# Patient Record
Sex: FEMALE | Employment: UNEMPLOYED | URBAN - METROPOLITAN AREA
[De-identification: names, ages, dates, MRNs, and addresses within clinical notes are randomized per-mention and may not be internally consistent; named-entity substitution may affect disease eponyms.]

---

## 2022-02-02 ENCOUNTER — TELEPHONE (OUTPATIENT)
Dept: FAMILY MEDICINE | Facility: CLINIC | Age: 52
End: 2022-02-02

## 2022-02-02 NOTE — TELEPHONE ENCOUNTER
Reason for Call:  Other appointment    Detailed comments: IMMIGRATION PHYSICAL    Phone Number Patient can be reached at: Cell number on file:    Telephone Information:   Mobile [phone]       Best Time: ANYTIME    Can we leave a detailed message on this number?     Call taken on 2/2/2022 at 4:13 PM by Charity Mckay LPN

## 2022-02-04 NOTE — TELEPHONE ENCOUNTER
Patient calling on status of scheduling of immigration physicals for her and daughter. Appointments have been made for 2/16 with back to back appointments as requested. Informed of cost and that they will need to bring $250. 00 for each physical, a valid ID or passport, insurance card, which patient stated they do not have any insurance. Informed to bring any immunization records, and any recent labs that may have been completed to the appointment. Patient voiced understanding. Charity Mckay LPN

## 2022-02-16 ENCOUNTER — OFFICE VISIT (OUTPATIENT)
Dept: FAMILY MEDICINE | Facility: CLINIC | Age: 52
End: 2022-02-16

## 2022-02-16 VITALS
HEART RATE: 100 BPM | DIASTOLIC BLOOD PRESSURE: 70 MMHG | WEIGHT: 107.13 LBS | TEMPERATURE: 97.9 F | SYSTOLIC BLOOD PRESSURE: 138 MMHG | HEIGHT: 57 IN | OXYGEN SATURATION: 100 % | RESPIRATION RATE: 16 BRPM | BODY MASS INDEX: 23.11 KG/M2

## 2022-02-16 DIAGNOSIS — Z23 NEED FOR PROPHYLACTIC VACCINATION AND INOCULATION AGAINST INFLUENZA: ICD-10-CM

## 2022-02-16 DIAGNOSIS — Z23 HIGH PRIORITY FOR 2019-NCOV VACCINE: ICD-10-CM

## 2022-02-16 DIAGNOSIS — Z23 NEED FOR VACCINATION: ICD-10-CM

## 2022-02-16 DIAGNOSIS — Z02.89 HISTORY AND PHYSICAL EXAMINATION, IMMIGRATION: Primary | ICD-10-CM

## 2022-02-16 PROCEDURE — 86706 HEP B SURFACE ANTIBODY: CPT | Performed by: FAMILY MEDICINE

## 2022-02-16 PROCEDURE — 86787 VARICELLA-ZOSTER ANTIBODY: CPT | Performed by: FAMILY MEDICINE

## 2022-02-16 PROCEDURE — 90682 RIV4 VACC RECOMBINANT DNA IM: CPT | Performed by: FAMILY MEDICINE

## 2022-02-16 PROCEDURE — 86708 HEPATITIS A ANTIBODY: CPT | Performed by: FAMILY MEDICINE

## 2022-02-16 PROCEDURE — 90471 IMMUNIZATION ADMIN: CPT | Performed by: FAMILY MEDICINE

## 2022-02-16 PROCEDURE — 86481 TB AG RESPONSE T-CELL SUSP: CPT | Performed by: FAMILY MEDICINE

## 2022-02-16 PROCEDURE — 87591 N.GONORRHOEAE DNA AMP PROB: CPT | Performed by: FAMILY MEDICINE

## 2022-02-16 PROCEDURE — 36415 COLL VENOUS BLD VENIPUNCTURE: CPT | Performed by: FAMILY MEDICINE

## 2022-02-16 PROCEDURE — 99385 PREV VISIT NEW AGE 18-39: CPT | Mod: 25 | Performed by: FAMILY MEDICINE

## 2022-02-16 PROCEDURE — 90472 IMMUNIZATION ADMIN EACH ADD: CPT | Performed by: FAMILY MEDICINE

## 2022-02-16 PROCEDURE — 90715 TDAP VACCINE 7 YRS/> IM: CPT | Performed by: FAMILY MEDICINE

## 2022-02-16 PROCEDURE — 0052A COVID-19,PF,PFIZER (12+ YRS): CPT | Performed by: FAMILY MEDICINE

## 2022-02-16 PROCEDURE — 86762 RUBELLA ANTIBODY: CPT | Performed by: FAMILY MEDICINE

## 2022-02-16 PROCEDURE — 86780 TREPONEMA PALLIDUM: CPT | Performed by: FAMILY MEDICINE

## 2022-02-16 PROCEDURE — 91305 COVID-19,PF,PFIZER (12+ YRS): CPT | Performed by: FAMILY MEDICINE

## 2022-02-16 ASSESSMENT — PAIN SCALES - GENERAL: PAINLEVEL: NO PAIN (0)

## 2022-02-16 NOTE — PROGRESS NOTES
Monet is here for INS physical exam.  She moved from the [facility] in 2021.  She lives in [place], [place] and is not working. Stated that overall she is doing well and has no concern today.  Denied of headache or dizziness.  No URI symptoms and denied of CP or SOB.  No fever or chill. No N/V/D/C.  No history of any of STD and denied of its risk.  No abnormal vaginal discharge or having rash in the pelvic area.  Denied of depression and has no history of depression, anxiety or mood disorder. No history or current homicidal or suicidal ideation.  No hallucination and has no history of psychiatric hospitalization.  No pain; no problem with sleeping.  Eating and drinking ok.  No problem with urination.  Denied of coughing or nigth sweat. No unintentional weight loss.  No exposure to TB or history of TB.  Has not had a positive PPD. Denied of drug use.      Problem list and histories reviewed & adjusted, as indicated.  Additional history: as documented      PAST MEDICAL HISTORY:   Past Medical History:   Diagnosis Date     No known health problems        PAST SURGICAL HISTORY:   Past Surgical History:   Procedure Laterality Date     NO HISTORY OF SURGERY         FAMILY HISTORY:   Family History   Problem Relation Age of Onset     No Known Problems Mother      Substance Abuse Father         alcoholism     Unknown/Adopted Maternal Grandmother      Unknown/Adopted Maternal Grandfather      Unknown/Adopted Paternal Grandmother      Unknown/Adopted Paternal Grandfather      Heart Disease Brother      No Known Problems Sister      No Known Problems Daughter      No Known Problems Brother      No Known Problems Brother      No Known Problems Brother      No Known Problems Sister      No Known Problems Sister      No Known Problems Sister      No Known Problems Sister        SOCIAL HISTORY:   Social History     Tobacco Use     Smoking status: Never Smoker     Smokeless tobacco: Never Used   Substance Use Topics     Alcohol use:  "Never        No Known Allergies    No current outpatient medications on file.         ROS:  Constitutional, HEENT, cardiovascular, pulmonary, gi and gu systems are negative, except as otherwise noted.      OBJECTIVE:                                                      .Vitals: /70 (BP Location: Right arm, Patient Position: Sitting, Cuff Size: Adult Regular)   Pulse 100   Temp 97.9  F (36.6  C) (Temporal)   Resp 16   Ht 1.45 m (4' 9.1\")   Wt 48.6 kg (107 lb 2 oz)   SpO2 100%   BMI 23.10 kg/m    BMI= Body mass index is 23.1 kg/m .   GENERAL: healthy, alert and no distress  EYES: Eyes grossly normal to inspection, PERRL and conjunctivae and sclerae normal  HENT: ear canals and TM's normal, nose and mouth without ulcers or lesions.  Nares are non-congested. Oropharynx is pink and moist. No tender with palpation to the sinuses.  NECK: no adenopathy, no palpable masses, or scars and thyroid normal to palpation  RESP: lungs clear to auscultation - no rales, rhonchi or wheezes  CV: regular rate and rhythm, no murmur, no peripheral edema and peripheral pulses strong  ABDOMEN: soft, nontender, no hepatosplenomegaly or palpable masses and bowel sounds normal  MS: no gross musculoskeletal defects noted, no edema. Walk with no limping, normal gait. All 4 extremities are equally in strength. Ankle, knees, hips, shoulders, elbows and wrists exams normal. Normal fine motor skills on fingers. Back is straight, no lordosis or scoliosis. No tender with palpation.  SKIN: no suspicious lesions or rashes  NEURO: Normal strength and tone, mentation intact and speech normal  PSYCH: mentation appears normal, affect normal/bright. No hallucination, suicidal or homicidal ideation    Diagnostic Test Results:  Results for orders placed or performed in visit on 02/16/22   Hepatitis Antibody A IgG     Status: Abnormal   Result Value Ref Range    Hepatitis A Antibody IgG Reactive (A) Nonreactive    Narrative    This assay cannot be " used for the diagnosis of acute HAV infection.   Varicella Zoster Virus Antibody IgG     Status: Normal   Result Value Ref Range    VZV Jenny IgG Instrument Value 854.3 >=165.0 Index    Varicella Zoster Antibody IgG Positive Positive   Hepatitis B Surface Antibody     Status: Abnormal   Result Value Ref Range    Hepatitis B Surface Antibody 0.00 (L) >=12.00 m[IU]/mL   Rubella Antibody IgG     Status: Normal   Result Value Ref Range    Rubella Jenny IgG Instrument Value 2.17 >=1.00 Index    Rubella Antibody IgG Positive Positive   Treponema Abs w Reflex to RPR and Titer     Status: Normal   Result Value Ref Range    Treponema Antibody Total Nonreactive Nonreactive   Quantiferon TB Gold Plus Grey Tube     Status: None    Specimen: Arm, Right; Blood   Result Value Ref Range    Quantiferon Nil Tube 0.46 IU/mL   Quantiferon TB Gold Plus Green Tube     Status: None    Specimen: Arm, Right; Blood   Result Value Ref Range    Quantiferon TB1 Tube 0.27 IU/mL   Quantiferon TB Gold Plus Yellow Tube     Status: None    Specimen: Arm, Right; Blood   Result Value Ref Range    Quantiferon TB2 Tube 0.20    Quantiferon TB Gold Plus Purple Tube     Status: None    Specimen: Arm, Right; Blood   Result Value Ref Range    Quantiferon Mitogen 5.93 IU/mL   Neisseria gonorrhoeae PCR     Status: Normal    Specimen: Urine, Voided   Result Value Ref Range    Neisseria gonorrhoeae Negative Negative   Quantiferon TB Gold Plus     Status: None    Specimen: Arm, Right; Blood   Result Value Ref Range    Quantiferon-TB Gold Plus Negative Negative    TB1 Ag minus Nil Value -0.19 IU/mL    TB2 Ag minus Nil Value -0.26 IU/mL    Mitogen minus Nil Result 5.47 IU/mL    Nil Result 0.46 IU/mL   Quantiferon-TB Gold Plus     Status: None    Specimen: Arm, Right; Blood    Narrative    The following orders were created for panel order Quantiferon-TB Gold Plus.  Procedure                               Abnormality         Status                     ---------                                -----------         ------                     Quantiferon TB Gold Plus[[id number]]                         Final result               Quantiferon TB Gold Plus...[[id number]]                      Final result               Quantiferon TB Gold Plus...[[id number]]                      Final result               Quantiferon TB Gold Plus...[[id number]]                      Final result               Quantiferon TB Gold Plus...[[id number]]                      Final result                 Please view results for these tests on the individual orders.           ASSESSMENT/PLAN:                                                      1. Health examination of defined subpopulation  I personal reviewed the report of Medical Examination and Vaccination Record from the Department of [employer]. She has no chronic medical condition. Has not had a positive PPD test and she displayed no symptoms of active TB.  No exposure to TB that she knows of.  Checked the QuantiFERON level today. Denied any risk for STI, but will check for RPR (syphyllis) and gonorrhea. She has no history of depression, anxiety, mood disorder and has never been diagnosed or hospitalized for any psychiatric disorders.  She displayed no physical or mental disorders with associated harmful behavior. She denied of using drug. Reviewed her immunization record, labs as ordered.  Will fill out her paper on her behalf once the results are available. Instructed not to open the sealed envelope.  All of her questions were answered and encouraged to call in if has any concern.    Also informed her that she should follow up with her primary provider for her routine and chronic medical care. I did not address any chronic medical problem today.  She understands.      ICD-10-CM    1. History and physical examination, immigration  Z02.89 Quantiferon-TB Gold Plus     Neisseria gonorrhoeae PCR     Treponema Abs w Reflex to RPR and Titer     Rubella Antibody  IgG     Hepatitis B Surface Antibody     Varicella Zoster Virus Antibody IgG     Hepatitis Antibody A IgG     [place] PHYSICAL     Hepatitis Antibody A IgG     Varicella Zoster Virus Antibody IgG     Hepatitis B Surface Antibody     Rubella Antibody IgG     Treponema Abs w Reflex to RPR and Titer     Quantiferon-TB Gold Plus     Neisseria gonorrhoeae PCR   2. Need for vaccination  Z23 TDAP VACCINE (Adacel, Boostrix)  [[id number]]     INFLUENZA QUAD, RECOMBINANT, P-FREE (RIV4) (FLUBLOK)   3. Need for prophylactic vaccination and inoculation against influenza  Z23    4. High priority for 2019-nCoV vaccine  Z23 COVID-19,PF,PFIZER (12+ Yrs GRAY LABEL)         F/U as needed.    Arya Ramirez Mai, MD  [facility]

## 2022-02-17 LAB
HAV IGG SER QL IA: REACTIVE
HBV SURFACE AB SERPL IA-ACNC: 0 M[IU]/ML
N GONORRHOEA DNA SPEC QL NAA+PROBE: NEGATIVE
RUBV IGG SERPL QL IA: 2.17 INDEX
RUBV IGG SERPL QL IA: POSITIVE
T PALLIDUM AB SER QL: NONREACTIVE
VZV IGG SER QL IA: 854.3 INDEX
VZV IGG SER QL IA: POSITIVE

## 2022-02-18 LAB
GAMMA INTERFERON BACKGROUND BLD IA-ACNC: 0.46 IU/ML
M TB IFN-G BLD-IMP: NEGATIVE
M TB IFN-G CD4+ BCKGRND COR BLD-ACNC: 5.47 IU/ML
MITOGEN IGNF BCKGRD COR BLD-ACNC: -0.19 IU/ML
MITOGEN IGNF BCKGRD COR BLD-ACNC: -0.26 IU/ML
QUANTIFERON MITOGEN: 5.93 IU/ML
QUANTIFERON NIL TUBE: 0.46 IU/ML
QUANTIFERON TB1 TUBE: 0.27 IU/ML
QUANTIFERON TB2 TUBE: 0.2

## 2022-02-22 ENCOUNTER — TELEPHONE (OUTPATIENT)
Dept: FAMILY MEDICINE | Facility: CLINIC | Age: 52
End: 2022-02-22

## 2022-02-22 NOTE — TELEPHONE ENCOUNTER
----- Message from Arya Ramirez Mai, MD sent at 2/21/2022  8:03 AM CST -----  Please let patient know that her labs showed she is immunized to hepatitis A, rubella and chickenpox but not to hepatitis B.  What it means is that she is up-to-date for her MMR, chickenpox and hepatitis A but she is not up-to-date for hepatitis B.  She will need to get the first dose of hepatitis B vaccine as soon as possible for her immigration physical.  She can get it through our clinic here or at any pharmacy.  I need the documentation of her getting the first dose of the hepatitis B at least for her immigration paper.  Thank you

## 2022-02-22 NOTE — TELEPHONE ENCOUNTER
Attempted to reach patient by phone. Unable to leave voicemail due to it not being set up. Will try again later.   Arti Shelley MA 2/22/2022

## 2022-02-23 NOTE — TELEPHONE ENCOUNTER
Patient called back and was given message. She is aware that she needs to get the Hepatitis B shot started, she stated she will call back again when she is ready to schedule it.

## 2022-02-23 NOTE — TELEPHONE ENCOUNTER
Attempted to reach patient by phone. Unable to leave voicemail due to it not being set up. Will try again later.   Arti Shelley MA 2/23/2022

## 2022-02-28 NOTE — TELEPHONE ENCOUNTER
Pharmacy staff was called, pt did get 1st hepatitis B vaccine Engerix-b at [facility] pharmacy on 2/25. Per pharmacy staff, immunization won't show up in [facility] because pharmacy gave it, it will eventually flow into [facility] but it is not there currently.   Amita Patel RN

## 2022-02-28 NOTE — TELEPHONE ENCOUNTER
Patient calling back stating she got this done in the pharmacy in [place].    Please advise    Rebeca Harrison

## 2022-02-28 NOTE — TELEPHONE ENCOUNTER
Immigration physical form completed, may pick it up or mail out at any time.  Please address appropriately.  Please noted double sided copy.

## 2022-08-29 ENCOUNTER — OFFICE VISIT (OUTPATIENT)
Dept: FAMILY MEDICINE | Facility: CLINIC | Age: 52
End: 2022-08-29
Payer: COMMERCIAL

## 2022-08-29 VITALS
HEIGHT: 57 IN | HEART RATE: 68 BPM | DIASTOLIC BLOOD PRESSURE: 74 MMHG | TEMPERATURE: 97.9 F | WEIGHT: 106 LBS | SYSTOLIC BLOOD PRESSURE: 130 MMHG | BODY MASS INDEX: 22.87 KG/M2

## 2022-08-29 DIAGNOSIS — Z00.00 ROUTINE GENERAL MEDICAL EXAMINATION AT A HEALTH CARE FACILITY: Primary | ICD-10-CM

## 2022-08-29 DIAGNOSIS — Z12.31 VISIT FOR SCREENING MAMMOGRAM: ICD-10-CM

## 2022-08-29 DIAGNOSIS — Z12.4 CERVICAL CANCER SCREENING: ICD-10-CM

## 2022-08-29 DIAGNOSIS — Z11.4 SCREENING FOR HIV (HUMAN IMMUNODEFICIENCY VIRUS): ICD-10-CM

## 2022-08-29 DIAGNOSIS — Z11.59 NEED FOR HEPATITIS C SCREENING TEST: ICD-10-CM

## 2022-08-29 DIAGNOSIS — Z13.220 SCREENING FOR HYPERLIPIDEMIA: ICD-10-CM

## 2022-08-29 DIAGNOSIS — Z12.11 SCREEN FOR COLON CANCER: ICD-10-CM

## 2022-08-29 DIAGNOSIS — R10.2 PELVIC PAIN IN FEMALE: ICD-10-CM

## 2022-08-29 LAB
ALBUMIN SERPL-MCNC: 4 G/DL (ref 3.4–5)
ALP SERPL-CCNC: 49 U/L (ref 40–150)
ALT SERPL W P-5'-P-CCNC: 36 U/L (ref 0–50)
ANION GAP SERPL CALCULATED.3IONS-SCNC: 5 MMOL/L (ref 3–14)
AST SERPL W P-5'-P-CCNC: 26 U/L (ref 0–45)
BILIRUB SERPL-MCNC: 0.6 MG/DL (ref 0.2–1.3)
BUN SERPL-MCNC: 16 MG/DL (ref 7–30)
CALCIUM SERPL-MCNC: 8.7 MG/DL (ref 8.5–10.1)
CHLORIDE BLD-SCNC: 104 MMOL/L (ref 94–109)
CHOLEST SERPL-MCNC: 207 MG/DL
CO2 SERPL-SCNC: 29 MMOL/L (ref 20–32)
CREAT SERPL-MCNC: 0.87 MG/DL (ref 0.52–1.04)
ERYTHROCYTE [DISTWIDTH] IN BLOOD BY AUTOMATED COUNT: 12.4 % (ref 10–15)
FASTING STATUS PATIENT QL REPORTED: YES
GFR SERPL CREATININE-BSD FRML MDRD: 80 ML/MIN/1.73M2
GLUCOSE BLD-MCNC: 96 MG/DL (ref 70–99)
HBA1C MFR BLD: 5.5 % (ref 0–5.6)
HCT VFR BLD AUTO: 40 % (ref 35–47)
HCV AB SERPL QL IA: NONREACTIVE
HDLC SERPL-MCNC: 70 MG/DL
HGB BLD-MCNC: 13.5 G/DL (ref 11.7–15.7)
HIV 1+2 AB+HIV1 P24 AG SERPL QL IA: NONREACTIVE
LDLC SERPL CALC-MCNC: 124 MG/DL
MCH RBC QN AUTO: 29.1 PG (ref 26.5–33)
MCHC RBC AUTO-ENTMCNC: 33.8 G/DL (ref 31.5–36.5)
MCV RBC AUTO: 86 FL (ref 78–100)
NONHDLC SERPL-MCNC: 137 MG/DL
PLATELET # BLD AUTO: 266 10E3/UL (ref 150–450)
POTASSIUM BLD-SCNC: 3.8 MMOL/L (ref 3.4–5.3)
PROT SERPL-MCNC: 8.2 G/DL (ref 6.8–8.8)
RBC # BLD AUTO: 4.64 10E6/UL (ref 3.8–5.2)
SODIUM SERPL-SCNC: 138 MMOL/L (ref 133–144)
TRIGL SERPL-MCNC: 64 MG/DL
TSH SERPL DL<=0.005 MIU/L-ACNC: 3.06 MU/L (ref 0.4–4)
WBC # BLD AUTO: 5.6 10E3/UL (ref 4–11)

## 2022-08-29 PROCEDURE — 85027 COMPLETE CBC AUTOMATED: CPT | Performed by: NURSE PRACTITIONER

## 2022-08-29 PROCEDURE — 80061 LIPID PANEL: CPT | Performed by: NURSE PRACTITIONER

## 2022-08-29 PROCEDURE — 80053 COMPREHEN METABOLIC PANEL: CPT | Performed by: NURSE PRACTITIONER

## 2022-08-29 PROCEDURE — G0145 SCR C/V CYTO,THINLAYER,RESCR: HCPCS | Performed by: NURSE PRACTITIONER

## 2022-08-29 PROCEDURE — 83036 HEMOGLOBIN GLYCOSYLATED A1C: CPT | Performed by: NURSE PRACTITIONER

## 2022-08-29 PROCEDURE — 86803 HEPATITIS C AB TEST: CPT | Performed by: NURSE PRACTITIONER

## 2022-08-29 PROCEDURE — 99396 PREV VISIT EST AGE 40-64: CPT | Performed by: NURSE PRACTITIONER

## 2022-08-29 PROCEDURE — 84443 ASSAY THYROID STIM HORMONE: CPT | Performed by: NURSE PRACTITIONER

## 2022-08-29 PROCEDURE — 87624 HPV HI-RISK TYP POOLED RSLT: CPT | Performed by: NURSE PRACTITIONER

## 2022-08-29 PROCEDURE — 87389 HIV-1 AG W/HIV-1&-2 AB AG IA: CPT | Performed by: NURSE PRACTITIONER

## 2022-08-29 PROCEDURE — 36415 COLL VENOUS BLD VENIPUNCTURE: CPT | Performed by: NURSE PRACTITIONER

## 2022-08-29 ASSESSMENT — ENCOUNTER SYMPTOMS
DIZZINESS: 0
DIARRHEA: 0
DYSURIA: 0
NERVOUS/ANXIOUS: 0
ARTHRALGIAS: 0
PARESTHESIAS: 0
ABDOMINAL PAIN: 0
BREAST MASS: 0
HEARTBURN: 0
HEMATOCHEZIA: 0
EYE PAIN: 0
WEAKNESS: 0
FREQUENCY: 0
MYALGIAS: 0
SORE THROAT: 0
COUGH: 0
CONSTIPATION: 0
SHORTNESS OF BREATH: 0
CHILLS: 0
FEVER: 0
HEADACHES: 0
NAUSEA: 0
JOINT SWELLING: 0
PALPITATIONS: 0
HEMATURIA: 0

## 2022-08-29 ASSESSMENT — PAIN SCALES - GENERAL: PAINLEVEL: NO PAIN (0)

## 2022-08-29 NOTE — PATIENT INSTRUCTIONS
Please contact [phone] to schedule your diagnostic procedure     Preventive Health Recommendations  Female Ages 50 - 64    Yearly exam: See your health care provider every year in order to  Review health changes.   Discuss preventive care.    Review your medicines if your doctor has prescribed any.    Get a Pap test every three years (unless you have an abnormal result and your provider advises testing more often).  If you get Pap tests with HPV test, you only need to test every 5 years, unless you have an abnormal result.   You do not need a Pap test if your uterus was removed (hysterectomy) and you have not had cancer.  You should be tested each year for STDs (sexually transmitted diseases) if you're at risk.   Have a mammogram every 1 to 2 years.  Have a colonoscopy at age 50, or have a yearly FIT test (stool test). These exams screen for colon cancer.    Have a cholesterol test every 5 years, or more often if advised.  Have a diabetes test (fasting glucose) every three years. If you are at risk for diabetes, you should have this test more often.   If you are at risk for osteoporosis (brittle bone disease), think about having a bone density scan (DEXA).    Shots: Get a flu shot each year. Get a tetanus shot every 10 years.    Nutrition:   Eat at least 5 servings of fruits and vegetables each day.  Eat whole-grain bread, whole-wheat pasta and brown rice instead of white grains and rice.  Get adequate Calcium and Vitamin D.     Lifestyle  Exercise at least 150 minutes a week (30 minutes a day, 5 days a week). This will help you control your weight and prevent disease.  Limit alcohol to one drink per day.  No smoking.   Wear sunscreen to prevent skin cancer.   See your dentist every six months for an exam and cleaning.  See your eye doctor every 1 to 2 years.

## 2022-08-29 NOTE — PROGRESS NOTES
SUBJECTIVE:   CC: Monet Finley is an 52 year old woman who presents for preventive health visit.       Patient has been advised of split billing requirements and indicates understanding: Yes  Healthy Habits:     Getting at least 3 servings of Calcium per day:  Yes    Bi-annual eye exam:  Yes    Dental care twice a year:  NO    Sleep apnea or symptoms of sleep apnea:  None    Diet:  Regular (no restrictions)    Frequency of exercise:  2-3 days/week    Duration of exercise:  15-30 minutes    Taking medications regularly:  Yes    Medication side effects:  None    PHQ-2 Total Score: 0    Additional concerns today:  No          Today's PHQ-2 Score:   PHQ-2 ( 1999 Pfizer) 8/29/2022   Q1: Little interest or pleasure in doing things 0   Q2: Feeling down, depressed or hopeless 0   PHQ-2 Score 0   Q1: Little interest or pleasure in doing things Several days   Q2: Feeling down, depressed or hopeless Not at all   PHQ-2 Score 1       Abuse: Current or Past (Physical, Sexual or Emotional) - No  Do you feel safe in your environment? Yes    Have you ever done Advance Care Planning? (For example, a Health Directive, POLST, or a discussion with a medical provider or your loved ones about your wishes): No, advance care planning information given to patient to review.  Patient declined advance care planning discussion at this time.    Social History     Tobacco Use     Smoking status: Never Smoker     Smokeless tobacco: Never Used   Substance Use Topics     Alcohol use: Never     If you drink alcohol do you typically have >3 drinks per day or >7 drinks per week? No    Alcohol Use 8/29/2022   Prescreen: >3 drinks/day or >7 drinks/week? Not Applicable       Reviewed orders with patient.  Reviewed health maintenance and updated orders accordingly - Yes  BP Readings from Last 3 Encounters:   08/29/22 130/74   02/16/22 138/70    Wt Readings from Last 3 Encounters:   08/29/22 48.1 kg (106 lb)   02/16/22 48.6 kg (107 lb 2 oz)                   There is no problem list on file for this patient.    Past Surgical History:   Procedure Laterality Date     NO HISTORY OF SURGERY         Social History     Tobacco Use     Smoking status: Never Smoker     Smokeless tobacco: Never Used   Substance Use Topics     Alcohol use: Never     Family History   Problem Relation Age of Onset     No Known Problems Mother      Substance Abuse Father         alcoholism     Unknown/Adopted Maternal Grandmother      Unknown/Adopted Maternal Grandfather      Unknown/Adopted Paternal Grandmother      Unknown/Adopted Paternal Grandfather      Heart Disease Brother      No Known Problems Sister      No Known Problems Daughter      No Known Problems Brother      No Known Problems Brother      No Known Problems Brother      No Known Problems Sister      No Known Problems Sister      No Known Problems Sister      No Known Problems Sister          No current outpatient medications on file.     No Known Allergies  Recent Labs   Lab Test 08/29/22  0745   A1C 5.5        Breast Cancer Screening:    FHS-7: No flowsheet data found.    Mammogram Screening: Recommended annual mammography  Pertinent mammograms are reviewed under the imaging tab.    History of abnormal Pap smear: NO - age 30- 65 PAP every 3 years recommended     Reviewed and updated as needed this visit by clinical staff                    Reviewed and updated as needed this visit by Provider                   Past Medical History:   Diagnosis Date     No known health problems       Past Surgical History:   Procedure Laterality Date     NO HISTORY OF SURGERY       OB History   No obstetric history on file.       Review of Systems   Constitutional: Negative for chills and fever.   HENT: Negative for congestion, ear pain, hearing loss and sore throat.    Eyes: Negative for pain and visual disturbance.   Respiratory: Negative for cough and shortness of breath.    Cardiovascular: Negative for chest pain, palpitations and  "peripheral edema.   Gastrointestinal: Negative for abdominal pain, constipation, diarrhea, heartburn, hematochezia and nausea.   Breasts:  Negative for tenderness, breast mass and discharge.   Genitourinary: Negative for dysuria, frequency, genital sores, hematuria, pelvic pain, urgency, vaginal bleeding and vaginal discharge.   Musculoskeletal: Negative for arthralgias, joint swelling and myalgias.   Skin: Negative for rash.   Neurological: Negative for dizziness, weakness, headaches and paresthesias.   Psychiatric/Behavioral: Negative for mood changes. The patient is not nervous/anxious.           OBJECTIVE:   /74 (BP Location: Right arm, Cuff Size: Adult Regular)   Pulse 68   Temp 97.9  F (36.6  C) (Tympanic)   Ht 1.441 m (4' 8.75\")   Wt 48.1 kg (106 lb)   BMI 23.14 kg/m    Physical Exam  GENERAL: healthy, alert and no distress  EYES: Eyes grossly normal to inspection, PERRL and conjunctivae and sclerae normal  HENT: ear canals and TM's normal, nose and mouth without ulcers or lesions  NECK: no adenopathy, no asymmetry, masses, or scars and thyroid normal to palpation  RESP: lungs clear to auscultation - no rales, rhonchi or wheezes  BREAST: normal without masses, tenderness or nipple discharge and no palpable axillary masses or adenopathy  CV: regular rate and rhythm, normal S1 S2, no S3 or S4, no murmur, click or rub, no peripheral edema and peripheral pulses strong  ABDOMEN: soft, nontender, no hepatosplenomegaly, no masses and bowel sounds normal   (female): normal female external genitalia and vaginal mucosa pink, moist,  no cervix noted   MS: no gross musculoskeletal defects noted, no edema  SKIN: no suspicious lesions or rashes  NEURO: Normal strength and tone, mentation intact and speech normal  PSYCH: mentation appears normal, affect normal/bright    Diagnostic Test Results:  Labs reviewed in Epic  Results for orders placed or performed in visit on 08/29/22 (from the past 24 hour(s)) "   Hemoglobin A1c   Result Value Ref Range    Hemoglobin A1C 5.5 0.0 - 5.6 %   CBC with platelets   Result Value Ref Range    WBC Count 5.6 4.0 - 11.0 10e3/uL    RBC Count 4.64 3.80 - 5.20 10e6/uL    Hemoglobin 13.5 11.7 - 15.7 g/dL    Hematocrit 40.0 35.0 - 47.0 %    MCV 86 78 - 100 fL    MCH 29.1 26.5 - 33.0 pg    MCHC 33.8 31.5 - 36.5 g/dL    RDW 12.4 10.0 - 15.0 %    Platelet Count 266 150 - 450 10e3/uL       ASSESSMENT/PLAN:   (Z00.00) Routine general medical examination at a health care facility  (primary encounter diagnosis)  Comment:   Plan: REVIEW OF HEALTH MAINTENANCE PROTOCOL ORDERS,         Comprehensive metabolic panel (BMP + Alb, Alk         Phos, ALT, AST, Total. Bili, TP), TSH with free        T4 reflex, CBC with platelets, Hemoglobin A1c            (Z12.31) Visit for screening mammogram  Comment:   Plan: MA SCREENING DIGITAL BILAT - Future  (s+30)            (Z12.11) Screen for colon cancer  Comment:   Plan: Colonoscopy Screening  Referral            (Z11.4) Screening for HIV (human immunodeficiency virus)  Comment:   Plan: HIV Antigen Antibody Combo            (Z11.59) Need for hepatitis C screening test  Comment:   Plan: Hepatitis C Screen Reflex to HCV RNA Quant and         Genotype            (Z12.4) Cervical cancer screening  Comment: Patient from the [facility] unable to obtain records patient does state that she had her ovary removed due to an appendicitis attack unclear if she has had a total hysterectomy are not.  Based on that did do a pelvic exam did not visualize the cervix.  Again patient is very unclear as to what the actual surgery she had if she had her cervix removed most likely did with the ovaries but based on patient's history would recommend just obtaining a pelvic ultrasound to confirm as we would not be able to obtain records  Plan: Pap Screen with HPV - recommended age 30 - 65         years      (Z13.220) Screening for hyperlipidemia  Comment:   Plan: Lipid panel  "reflex to direct LDL Non-fasting            (R10.2) Pelvic pain in female  Comment:   Plan: US Pelvic Complete with Transvaginal              Patient has been advised of split billing requirements and indicates understanding: Yes    COUNSELING:  Reviewed preventive health counseling, as reflected in patient instructions       Regular exercise       Healthy diet/nutrition       Vision screening       Alcohol Use       Osteoporosis prevention/bone health       Colorectal Cancer Screening       Consider Hep C screening for all patients one time for ages 18-79 years       HIV screeninx in teen years, 1x in adult years, and at intervals if high risk       (Mindy)menopause management    Estimated body mass index is 23.1 kg/m  as calculated from the following:    Height as of 22: 1.45 m (4' 9.1\").    Weight as of 22: 48.6 kg (107 lb 2 oz).        She reports that she has never smoked. She has never used smokeless tobacco.      Counseling Resources:  ATP IV Guidelines  Pooled Cohorts Equation Calculator  Breast Cancer Risk Calculator  BRCA-Related Cancer Risk Assessment: FHS-7 Tool  FRAX Risk Assessment  ICSI Preventive Guidelines  Dietary Guidelines for Americans, 2010  USDA's MyPlate  ASA Prophylaxis  Lung CA Screening    Ingrid Lemus, VALORIE CNP  [facility]  "

## 2022-08-29 NOTE — LETTER
August 30, 2022      Monet Finley  [address] [facility] [address]        Dear ,    We are writing to inform you of your test results.    Hepatitis C and HIV were negative comprehensive panel was within normal limits indicating good kidney and liver functions.  CBC was within normal limits with normal hemoglobin levels.  Hemoglobin A1c screening for diabetes was within normal limits no concerns for diabetes.  And your thyroid was within normal limits     Your cholesterol numbers are mildly elevated with a total cholesterol at 207 we like to see that below 200 but your good cholesterol which is the HDL was at 70 relates that above 50 based on these numbers your overall cardiac risk factor is less than 2% recommend low-cholesterol diet and exercise.       Resulted Orders   Hemoglobin A1c   Result Value Ref Range    Hemoglobin A1C 5.5 0.0 - 5.6 %      Comment:      Normal <5.7%   Prediabetes 5.7-6.4%    Diabetes 6.5% or higher     Note: Adopted from ADA consensus guidelines.   CBC with platelets   Result Value Ref Range    WBC Count 5.6 4.0 - 11.0 10e3/uL    RBC Count 4.64 3.80 - 5.20 10e6/uL    Hemoglobin 13.5 11.7 - 15.7 g/dL    Hematocrit 40.0 35.0 - 47.0 %    MCV 86 78 - 100 fL    MCH 29.1 26.5 - 33.0 pg    MCHC 33.8 31.5 - 36.5 g/dL    RDW 12.4 10.0 - 15.0 %    Platelet Count 266 150 - 450 10e3/uL   TSH with free T4 reflex   Result Value Ref Range    TSH 3.06 0.40 - 4.00 mU/L   Comprehensive metabolic panel (BMP + Alb, Alk Phos, ALT, AST, Total. Bili, TP)   Result Value Ref Range    Sodium 138 133 - 144 mmol/L    Potassium 3.8 3.4 - 5.3 mmol/L    Chloride 104 94 - 109 mmol/L    Carbon Dioxide (CO2) 29 20 - 32 mmol/L    Anion Gap 5 3 - 14 mmol/L    Urea Nitrogen 16 7 - 30 mg/dL    Creatinine 0.87 0.52 - 1.04 mg/dL    Calcium 8.7 8.5 - 10.1 mg/dL    Glucose 96 70 - 99 mg/dL    Alkaline Phosphatase 49 40 - 150 U/L    AST 26 0 - 45 U/L    ALT 36 0 - 50 U/L    Protein Total 8.2 6.8 - 8.8 g/dL    Albumin  4.0 3.4 - 5.0 g/dL    Bilirubin Total 0.6 0.2 - 1.3 mg/dL    GFR Estimate 80 >60 mL/min/1.73m2      Comment:      Effective December 21, 2021 eGFRcr in adults is calculated using the 2021 CKD-EPI creatinine equation which includes age and gender (Oly hernandez al., [place], DOI: 10.1056/GLCLaw8591821)   Lipid panel reflex to direct LDL Non-fasting   Result Value Ref Range    Cholesterol 207 (H) <200 mg/dL    Triglycerides 64 <150 mg/dL    Direct Measure HDL 70 >=50 mg/dL    LDL Cholesterol Calculated 124 (H) <=100 mg/dL    Non HDL Cholesterol 137 (H) <130 mg/dL    Patient Fasting > 8hrs? Yes     Narrative    Cholesterol  Desirable:  <200 mg/dL    Triglycerides  Normal:  Less than 150 mg/dL  Borderline High:  150-199 mg/dL  High:  200-499 mg/dL  Very High:  Greater than or equal to 500 mg/dL    Direct Measure HDL  Female:  Greater than or equal to 50 mg/dL   Male:  Greater than or equal to 40 mg/dL    LDL Cholesterol  Desirable:  <100mg/dL  Above Desirable:  100-129 mg/dL   Borderline High:  130-159 mg/dL   High:  160-189 mg/dL   Very High:  >= 190 mg/dL    Non HDL Cholesterol  Desirable:  130 mg/dL  Above Desirable:  130-159 mg/dL  Borderline High:  160-189 mg/dL  High:  190-219 mg/dL  Very High:  Greater than or equal to 220 mg/dL   Hepatitis C Screen Reflex to HCV RNA Quant and Genotype   Result Value Ref Range    Hepatitis C Antibody Nonreactive Nonreactive    Narrative    Assay performance characteristics have not been established for newborns, infants, and children.   HIV Antigen Antibody Combo   Result Value Ref Range    HIV Antigen Antibody Combo Nonreactive Nonreactive      Comment:      HIV-1 p24 Ag & HIV-1/HIV-2 Ab Not Detected       If you have any questions or concerns, please call the clinic at the number listed above.       Sincerely,      VALORIE Harrell CNP/ ss

## 2022-08-30 NOTE — RESULT ENCOUNTER NOTE
Please Notify Gemma  of test result    Hepatitis C and HIV were negative comprehensive panel was within normal limits indicating good kidney and liver functions.  CBC was within normal limits with normal hemoglobin levels.  Hemoglobin A1c screening for diabetes was within normal limits no concerns for diabetes.  And your thyroid was within normal limits    Your cholesterol numbers are mildly elevated with a total cholesterol at 207 we like to see that below 200 but your good cholesterol which is the HDL was at 70 relates that above 50 based on these numbers your overall cardiac risk factor is less than 2% recommend low-cholesterol diet and exercise.     The 10-year ASCVD risk score (Frankie MADELEINE Jr., et al., 2013) is: 1.1%    Values used to calculate the score:      Age: 52 years      Sex: Female      Is Non- : No      Diabetic: No      Tobacco smoker: No      Systolic Blood Pressure: 130 mmHg      Is BP treated: No      HDL Cholesterol: 70 mg/dL      Total Cholesterol: 207 mg/dL     Ingrid Lemus CNP

## 2022-08-31 LAB
BKR LAB AP GYN ADEQUACY: NORMAL
BKR LAB AP GYN INTERPRETATION: NORMAL
BKR LAB AP HPV REFLEX: NORMAL
BKR LAB AP PREVIOUS ABNORMAL: NORMAL
PATH REPORT.COMMENTS IMP SPEC: NORMAL
PATH REPORT.COMMENTS IMP SPEC: NORMAL
PATH REPORT.RELEVANT HX SPEC: NORMAL

## 2022-09-01 LAB
HUMAN PAPILLOMA VIRUS 16 DNA: NEGATIVE
HUMAN PAPILLOMA VIRUS 18 DNA: NEGATIVE
HUMAN PAPILLOMA VIRUS FINAL DIAGNOSIS: NORMAL
HUMAN PAPILLOMA VIRUS OTHER HR: NEGATIVE

## 2023-02-06 ENCOUNTER — HOSPITAL ENCOUNTER (EMERGENCY)
Facility: CLINIC | Age: 53
Discharge: LEFT BEFORE DISCHARGE | End: 2023-02-06
Attending: NURSE PRACTITIONER
Payer: COMMERCIAL

## 2023-02-14 ENCOUNTER — OFFICE VISIT (OUTPATIENT)
Dept: FAMILY MEDICINE | Facility: CLINIC | Age: 53
End: 2023-02-14
Payer: COMMERCIAL

## 2023-02-14 VITALS
SYSTOLIC BLOOD PRESSURE: 116 MMHG | RESPIRATION RATE: 16 BRPM | BODY MASS INDEX: 23.22 KG/M2 | DIASTOLIC BLOOD PRESSURE: 60 MMHG | WEIGHT: 107.6 LBS | OXYGEN SATURATION: 99 % | HEART RATE: 54 BPM | HEIGHT: 57 IN | TEMPERATURE: 97.3 F

## 2023-02-14 DIAGNOSIS — H72.93 PERFORATION OF BOTH TYMPANIC MEMBRANES: Primary | ICD-10-CM

## 2023-02-14 DIAGNOSIS — H92.02 LEFT EAR PAIN: ICD-10-CM

## 2023-02-14 DIAGNOSIS — Z12.11 SCREEN FOR COLON CANCER: ICD-10-CM

## 2023-02-14 PROCEDURE — 99213 OFFICE O/P EST LOW 20 MIN: CPT | Performed by: FAMILY MEDICINE

## 2023-02-14 ASSESSMENT — PAIN SCALES - GENERAL: PAINLEVEL: MILD PAIN (2)

## 2023-02-14 NOTE — PROGRESS NOTES
"  Assessment & Plan       Perforation of both tympanic membranes  See ENT  - Adult ENT  Referral; Future    Left ear pain and itching  Reassured no ear wax, skin looks normal too.  - Adult ENT  Referral; Future         Return if symptoms worsen or fail to improve.    Bobby Waggoner MD  [facility] JAMES Healy is a 52 year old accompanied by her sister, presenting for the following health issues:  No chief complaint on file.    Chief Complaint   Patient presents with     Cerumen (impacted)     Bilateral. Left ear is worse. Ears are itchy and painful. Patient states she has a ruptured ear drum in both ears but the right is more ruptured.   ENT specialist had history of ruptured ear drum.  Tried an ointment with a steroid to help with the itchiness of the ear, did it for 7 days but wasn't helpful.      History of Present Illness       Reason for visit:  Ear wax removal    She eats 4 or more servings of fruits and vegetables daily.She consumes 1 sweetened beverage(s) daily.She exercises with enough effort to increase her heart rate 30 to 60 minutes per day.  She exercises with enough effort to increase her heart rate 3 or less days per week.   She is taking medications regularly.         Review of Systems       Objective    /60 (BP Location: Right arm, Patient Position: Sitting, Cuff Size: Adult Regular)   Pulse 54   Temp 97.3  F (36.3  C) (Tympanic)   Resp 16   Ht 1.452 m (4' 9.17\")   Wt 48.8 kg (107 lb 9.6 oz)   SpO2 99%   BMI 23.15 kg/m    Body mass index is 23.15 kg/m .  Physical Exam   GENERAL: healthy, alert and no distress  HENT: ear canals normal  Right TM fully ruptured, no wax,   Left TM: partial rupture, no wax.  NECK: no adenopathy, no asymmetry, masses, or scars and thyroid normal to palpation  Jaw: no clunks with open/close                "

## 2023-02-14 NOTE — PATIENT INSTRUCTIONS
No ear wax    Both ear drums are ruptured  We need to see ENT to discuss why the pain? If there are other ointments or safe drops to do for the itching of the ear.  Ok to put a moisturizer like olive oil or mineral oil small amount

## 2023-03-23 ENCOUNTER — TELEPHONE (OUTPATIENT)
Dept: FAMILY MEDICINE | Facility: CLINIC | Age: 53
End: 2023-03-23
Payer: COMMERCIAL

## 2023-03-23 NOTE — TELEPHONE ENCOUNTER
Patient Quality Outreach    Patient is due for the following:   Breast Cancer Screening - Mammogram    Next Steps:   Schedule a office visit for a mammogram    Type of outreach:    Phone, left message for patient/parent to call back.      Questions for provider review:    None     Nila Loya, CMA

## 2023-05-20 ENCOUNTER — HEALTH MAINTENANCE LETTER (OUTPATIENT)
Age: 53
End: 2023-05-20

## 2023-05-26 ENCOUNTER — TELEPHONE (OUTPATIENT)
Dept: OTOLARYNGOLOGY | Facility: CLINIC | Age: 53
End: 2023-05-26
Payer: COMMERCIAL

## 2023-05-26 NOTE — TELEPHONE ENCOUNTER
[facility] wants the patient to have an audiology appointment.  There was one available the same day, just earlier in the day.  Please explain to patient that the audio will help decide treatment options.   IF the audio does not work for her, she will need to reschedule with an audio.    Vidya Smith LPN on 5/26/2023 at 12:46 PM

## 2023-10-22 ENCOUNTER — HEALTH MAINTENANCE LETTER (OUTPATIENT)
Age: 53
End: 2023-10-22

## 2023-11-21 NOTE — PROGRESS NOTES
Chief Complaint   Patient presents with    Ear Problem     History of bilateral perforation of ears- about 5 years ago- was told to get surgery but her insurance wouldn't pay for it now she has new insurance and would like 2nd opinion- she does c/o itchy ears     History of Present Illness   Monet Finley is a 53 year old female who presents to me today for ear evaluation.  I am seeing this patient in consultation for perforation of both tympanic membranes at the request of the provider Dr. Waggoner. The patient reports a long history of bilateral tympanic membrane perforations.  The patient is unsure why she has holes in her eardrums and think it might be due to previous infections when she was younger.  She denies any history of previous ear surgery.  She had discussed closure of the larger, right-sided perforation with the surgeon a few years ago but deferred treatment because she did not have insurance at that time.  She will occasionally get some drainage from the right ear, maybe a few times a year that seems to stop spontaneously.  No significant history of ear infections recently.  She does intermittently have some ear itching that is greater on the right-hand side.  No eric or persistent otorrhea, bloody otorrhea.  No dizziness or vertigo.  She does note that the hearing is much worse in the right ear versus the left ear.     Past Medical History  There is no problem list on file for this patient.    Current Medications   No current outpatient medications on file.    Allergies  No Known Allergies    Social History   Social History     Socioeconomic History    Marital status:    Tobacco Use    Smoking status: Never    Smokeless tobacco: Never   Vaping Use    Vaping Use: Never used   Substance and Sexual Activity    Alcohol use: Never    Drug use: Never    Sexual activity: Yes     Partners: Male       Family History  Family History   Problem Relation Age of Onset    No Known Problems Mother      Substance Abuse Father         alcoholism    Unknown/Adopted Maternal Grandmother     Unknown/Adopted Maternal Grandfather     Unknown/Adopted Paternal Grandmother     Unknown/Adopted Paternal Grandfather     Heart Disease Brother     No Known Problems Sister     No Known Problems Daughter     No Known Problems Brother     No Known Problems Brother     No Known Problems Brother     No Known Problems Sister     No Known Problems Sister     No Known Problems Sister     No Known Problems Sister        Review of Systems  As per HPI and PMHx, otherwise 10+ comprehensive system review is negative.    Physical Exam  /75 (BP Location: Right arm, Patient Position: Chair, Cuff Size: Adult Regular)   Pulse 59   Temp 97  F (36.1  C) (Tympanic)   Wt 49.9 kg (110 lb)   BMI 23.67 kg/m    GENERAL: Patient is a pleasant, cooperative 53 year old female in no acute distress.  HEAD: Normocephalic, atraumatic.  Hair and scalp are normal.  EYES: Pupils are equal, round, reactive to light and accommodation.  Extraocular movements are intact.  The sclera nonicteric without injection.  The extraocular structures are normal.  EARS: See below.  NOSE: Nares are patent.  Nasal mucosa is pink and moist.  Negative anterior rhinoscopy.  NEUROLOGIC: Cranial nerves II through XII are grossly intact.  Voice is strong.  Patient is House-Brackmann I/VI bilaterally.  CARDIOVASCULAR: Extremities are warm and well-perfused.  No significant peripheral edema.  RESPIRATORY: Patient has nonlabored breathing without cough, wheeze, stridor.  PSYCHIATRIC: Patient is alert and oriented.  Mood and affect appear normal.  SKIN: Warm and dry.  No scalp, face, or neck lesions noted.    Physical Exam and Procedure    EARS: Normal shape and symmetry.  No tenderness when palpating the mastoid or tragal areas bilaterally.  The ears were then examined under the otic binocular microscope. Otoscopic exam on the right reveals a clear canal.  There is a near-total  perforation of the right tympanic membrane.  There is some residual tympanic membrane around the superior portion of the annulus.  There is erosion of most of the lateral process of the malleus and umbo.  It looks like there is likely some incus erosion.  There might be a portion of the stapes still present, however there is some retracted, mucosalized tympanic membrane over the area of the incudostapedial joint.  No granulation, active drainage, or evidence of cholesteatoma.                  Attention was turned to the left ear.  Otoscopic exam on the left reveals a clear canal.  The left tympanic membrane has a roughly ear tube size perforation centrally with a bit of myringosclerosis involving the inferior portion of the tympanic membrane.  There is no evidence of middle ear effusion, significant retraction, granulation, drainage, or evidence of cholesteatoma.          Audiogram  The patient underwent an audiogram performed today.  My review of the audiogram shows severe to moderately severe mixed hearing loss in the right ear and moderate sloping to moderately severe mixed hearing loss in the left ear.  Pure-tone average is 79 dB on the right and 48 dB on the left.  Speech reception threshold is 80 dB on the right and 50 dB on the left.  The patient had 80% word recognition on the right and 100% word recognition on the left.  The patient had a intermediate volume type B tympanogram on the right and a intermediate volume type B tympanogram on the left.      Assessment and Plan     ICD-10-CM    1. Mixed conductive and sensorineural hearing loss of both ears  H90.6 Adult Audiology  Referral     Microscopy, Binocular     Adult ENT  Referral      2. Chronic Eustachian tube dysfunction, bilateral  H69.93 Adult Audiology  Referral     Microscopy, Binocular     Adult ENT  Referral      3. Perforation of tympanic membrane, bilateral  H72.93 Adult Audiology  Referral      Microscopy, Binocular     Adult ENT  Referral      4. Total perforation of tympanic membrane, right  H72.821 Adult Audiology  Referral     Microscopy, Binocular     Adult ENT  Referral        It was my pleasure seeing Monet Finley today in clinic.  The patient presents today with near-total tympanic membrane perforation on the right-hand side and a small, roughly ear tube size perforation on the left.  Her perforations have been longstanding.  She does have fairly significant mixed hearing loss, obviously greater on the right-hand side.  She has not tried hearing aids in the past.  She was told by another surgeon that the right ear needs to be fixed.  I am not exactly sure that surgery is necessary.  We did discuss that this would be a high risk surgery and potentially would require lateral graft tympanoplasty which might have no impact to the hearing and could have negative impact the hearing.  It would also depend on what is remaining of the ossicles during middle ear exploration with a small chance of significant or permanent hearing loss, traditionally quoted as 2 to 3%.  I would be very reluctant to fix the left ear as the hole is about the size of an ear tube and is likely helping manage her middle ear disease.  We did discuss water precautions for both ears and discussed the use of steroid ointment for ear itching as needed.    We discussed a trial of a hearing aid for both ears prior to considering surgical options.  Given the extent of the right tympanic membrane perforation, I would recommend her meeting with an otologist given the surgical complexity of the ear.    We spent the remainder of today's visit on education. We discussed hearing protection in noisy environments.    The patient is medically cleared for consideration of a hearing aid evaluation.    The patient will follow up as necessary for worsening symptoms or changes in symptoms. I have also recommended repeat  audiogram in 1-3 years, or sooner if symptoms warrant.      Julian Patricio MD  Department of Otolaryngology-Head and Neck Surgery  [facility]

## 2023-11-29 ENCOUNTER — OFFICE VISIT (OUTPATIENT)
Dept: OTOLARYNGOLOGY | Facility: CLINIC | Age: 53
End: 2023-11-29
Payer: COMMERCIAL

## 2023-11-29 ENCOUNTER — OFFICE VISIT (OUTPATIENT)
Dept: AUDIOLOGY | Facility: CLINIC | Age: 53
End: 2023-11-29
Payer: COMMERCIAL

## 2023-11-29 VITALS
HEART RATE: 59 BPM | SYSTOLIC BLOOD PRESSURE: 123 MMHG | BODY MASS INDEX: 23.67 KG/M2 | WEIGHT: 110 LBS | DIASTOLIC BLOOD PRESSURE: 75 MMHG | TEMPERATURE: 97 F

## 2023-11-29 DIAGNOSIS — H72.821: ICD-10-CM

## 2023-11-29 DIAGNOSIS — H69.93 CHRONIC EUSTACHIAN TUBE DYSFUNCTION, BILATERAL: ICD-10-CM

## 2023-11-29 DIAGNOSIS — H90.6 MIXED HEARING LOSS, BILATERAL: Primary | ICD-10-CM

## 2023-11-29 DIAGNOSIS — H90.6 MIXED CONDUCTIVE AND SENSORINEURAL HEARING LOSS OF BOTH EARS: Primary | ICD-10-CM

## 2023-11-29 DIAGNOSIS — H72.93 PERFORATION OF TYMPANIC MEMBRANE, BILATERAL: ICD-10-CM

## 2023-11-29 DIAGNOSIS — H90.6 MIXED CONDUCTIVE AND SENSORINEURAL HEARING LOSS OF BOTH EARS: ICD-10-CM

## 2023-11-29 PROCEDURE — 92504 EAR MICROSCOPY EXAMINATION: CPT | Performed by: OTOLARYNGOLOGY

## 2023-11-29 PROCEDURE — 99204 OFFICE O/P NEW MOD 45 MIN: CPT | Mod: 25 | Performed by: OTOLARYNGOLOGY

## 2023-11-29 PROCEDURE — 92550 TYMPANOMETRY & REFLEX THRESH: CPT | Performed by: AUDIOLOGIST

## 2023-11-29 PROCEDURE — 92557 COMPREHENSIVE HEARING TEST: CPT | Performed by: AUDIOLOGIST

## 2023-11-29 ASSESSMENT — PAIN SCALES - GENERAL: PAINLEVEL: NO PAIN (0)

## 2023-11-29 NOTE — LETTER
11/29/2023         RE: Monet Finley  [address] [facility] [address]        Dear Colleague,    Thank you for referring your patient, Monet Finley, to the [facility]. Please see a copy of my visit note below.    Chief Complaint   Patient presents with     Ear Problem     History of bilateral perforation of ears- about 5 years ago- was told to get surgery but her insurance wouldn't pay for it now she has new insurance and would like 2nd opinion- she does c/o itchy ears     History of Present Illness   Monet Finley is a 53 year old female who presents to me today for ear evaluation.  I am seeing this patient in consultation for perforation of both tympanic membranes at the request of the provider Dr. Waggoner. The patient reports a long history of bilateral tympanic membrane perforations.  The patient is unsure why she has holes in her eardrums and think it might be due to previous infections when she was younger.  She denies any history of previous ear surgery.  She had discussed closure of the larger, right-sided perforation with the surgeon a few years ago but deferred treatment because she did not have insurance at that time.  She will occasionally get some drainage from the right ear, maybe a few times a year that seems to stop spontaneously.  No significant history of ear infections recently.  She does intermittently have some ear itching that is greater on the right-hand side.  No eric or persistent otorrhea, bloody otorrhea.  No dizziness or vertigo.  She does note that the hearing is much worse in the right ear versus the left ear.     Past Medical History  There is no problem list on file for this patient.    Current Medications   No current outpatient medications on file.    Allergies  No Known Allergies    Social History   Social History     Socioeconomic History     Marital status:    Tobacco Use     Smoking status: Never     Smokeless tobacco: Never   Vaping  Use     Vaping Use: Never used   Substance and Sexual Activity     Alcohol use: Never     Drug use: Never     Sexual activity: Yes     Partners: Male       Family History  Family History   Problem Relation Age of Onset     No Known Problems Mother      Substance Abuse Father         alcoholism     Unknown/Adopted Maternal Grandmother      Unknown/Adopted Maternal Grandfather      Unknown/Adopted Paternal Grandmother      Unknown/Adopted Paternal Grandfather      Heart Disease Brother      No Known Problems Sister      No Known Problems Daughter      No Known Problems Brother      No Known Problems Brother      No Known Problems Brother      No Known Problems Sister      No Known Problems Sister      No Known Problems Sister      No Known Problems Sister        Review of Systems  As per HPI and PMHx, otherwise 10+ comprehensive system review is negative.    Physical Exam  /75 (BP Location: Right arm, Patient Position: Chair, Cuff Size: Adult Regular)   Pulse 59   Temp 97  F (36.1  C) (Tympanic)   Wt 49.9 kg (110 lb)   BMI 23.67 kg/m    GENERAL: Patient is a pleasant, cooperative 53 year old female in no acute distress.  HEAD: Normocephalic, atraumatic.  Hair and scalp are normal.  EYES: Pupils are equal, round, reactive to light and accommodation.  Extraocular movements are intact.  The sclera nonicteric without injection.  The extraocular structures are normal.  EARS: See below.  NOSE: Nares are patent.  Nasal mucosa is pink and moist.  Negative anterior rhinoscopy.  NEUROLOGIC: Cranial nerves II through XII are grossly intact.  Voice is strong.  Patient is House-Brackmann I/VI bilaterally.  CARDIOVASCULAR: Extremities are warm and well-perfused.  No significant peripheral edema.  RESPIRATORY: Patient has nonlabored breathing without cough, wheeze, stridor.  PSYCHIATRIC: Patient is alert and oriented.  Mood and affect appear normal.  SKIN: Warm and dry.  No scalp, face, or neck lesions noted.    Physical  Exam and Procedure    EARS: Normal shape and symmetry.  No tenderness when palpating the mastoid or tragal areas bilaterally.  The ears were then examined under the otic binocular microscope. Otoscopic exam on the right reveals a clear canal.  There is a near-total perforation of the right tympanic membrane.  There is some residual tympanic membrane around the superior portion of the annulus.  There is erosion of most of the lateral process of the malleus and umbo.  It looks like there is likely some incus erosion.  There might be a portion of the stapes still present, however there is some retracted, mucosalized tympanic membrane over the area of the incudostapedial joint.  No granulation, active drainage, or evidence of cholesteatoma.                  Attention was turned to the left ear.  Otoscopic exam on the left reveals a clear canal.  The left tympanic membrane has a roughly ear tube size perforation centrally with a bit of myringosclerosis involving the inferior portion of the tympanic membrane.  There is no evidence of middle ear effusion, significant retraction, granulation, drainage, or evidence of cholesteatoma.          Audiogram  The patient underwent an audiogram performed today.  My review of the audiogram shows severe to moderately severe mixed hearing loss in the right ear and moderate sloping to moderately severe mixed hearing loss in the left ear.  Pure-tone average is 79 dB on the right and 48 dB on the left.  Speech reception threshold is 80 dB on the right and 50 dB on the left.  The patient had 80% word recognition on the right and 100% word recognition on the left.  The patient had a intermediate volume type B tympanogram on the right and a intermediate volume type B tympanogram on the left.      Assessment and Plan     ICD-10-CM    1. Mixed conductive and sensorineural hearing loss of both ears  H90.6 Adult Audiology  Referral     Microscopy, Binocular     Adult ENT   Referral      2. Chronic Eustachian tube dysfunction, bilateral  H69.93 Adult Audiology  Referral     Microscopy, Binocular     Adult ENT  Referral      3. Perforation of tympanic membrane, bilateral  H72.93 Adult Audiology  Referral     Microscopy, Binocular     Adult ENT  Referral      4. Total perforation of tympanic membrane, right  H72.821 Adult Audiology  Referral     Microscopy, Binocular     Adult ENT  Referral        It was my pleasure seeing Monet Finley today in clinic.  The patient presents today with near-total tympanic membrane perforation on the right-hand side and a small, roughly ear tube size perforation on the left.  Her perforations have been longstanding.  She does have fairly significant mixed hearing loss, obviously greater on the right-hand side.  She has not tried hearing aids in the past.  She was told by another surgeon that the right ear needs to be fixed.  I am not exactly sure that surgery is necessary.  We did discuss that this would be a high risk surgery and potentially would require lateral graft tympanoplasty which might have no impact to the hearing and could have negative impact the hearing.  It would also depend on what is remaining of the ossicles during middle ear exploration with a small chance of significant or permanent hearing loss, traditionally quoted as 2 to 3%.  I would be very reluctant to fix the left ear as the hole is about the size of an ear tube and is likely helping manage her middle ear disease.  We did discuss water precautions for both ears and discussed the use of steroid ointment for ear itching as needed.    We discussed a trial of a hearing aid for both ears prior to considering surgical options.  Given the extent of the right tympanic membrane perforation, I would recommend her meeting with an otologist given the surgical complexity of the ear.    We spent the remainder of today's visit on  education. We discussed hearing protection in noisy environments.    The patient is medically cleared for consideration of a hearing aid evaluation.    The patient will follow up as necessary for worsening symptoms or changes in symptoms. I have also recommended repeat audiogram in 1-3 years, or sooner if symptoms warrant.      Julian Patricio MD  Department of Otolaryngology-Head and Neck Surgery  [facility]       Again, thank you for allowing me to participate in the care of your patient.        Sincerely,        Julian Patricio MD

## 2023-11-29 NOTE — PROGRESS NOTES
AUDIOLOGY REPORT:    Patient was referred to [facility] Audiology from ENT by Dr. Patricio for a hearing examination. Patient reports bilateral tympanic membrane perforations. They were accompanied today by their sister.    Testing:    Otoscopy:   Otoscopic exam indicates ears are clear of cerumen bilaterally     Tympanograms:    RIGHT: large ear canal volume consistent with tympanic membrane perforation     LEFT:   large ear canal volume consistent with tympanic membrane perforation    Reflexes (reported by stimulus ear): 1000 Hz  RIGHT: Ipsilateral is absent at frequencies tested  RIGHT: Contralateral is absent at frequencies tested  LEFT:   Ipsilateral is elevated  LEFT:   Contralateral is absent at frequencies tested    Thresholds:   Pure Tone Thresholds assessed using standard techniques audiometry with good  reliability from 250-8000 Hz bilaterally using insert earphones and circumaural headphones     RIGHT:  mild and profound mixed hearing loss    LEFT:    moderate-severe and profound mixed hearing loss   NOTE: Could not mask for left ear bone conduction at 250-1000 Hz due to equipment limits. Change in transducers did not merit a change in thresholds.     Barbara: Negative     Speech Reception Threshold:    RIGHT: 80 dB HL    LEFT:   50 dB HL    Word Recognition Score:     RIGHT: 80% at 100 dB HL using NU-6 recorded word list.    LEFT:   100% at 85 dB HL using NU-6 recorded word list.    Discussed results with the patient and her sister.     Patient was returned to ENT for follow up.     Raymond Guzman CCC-A  Licensed Audiologist  11/29/2023

## 2023-12-14 NOTE — TELEPHONE ENCOUNTER
FUTURE VISIT INFORMATION      FUTURE VISIT INFORMATION:  Date: 2/21/2024  Time: 7:30 AM  [place] [facility] [place]  REFERRAL INFORMATION:  Referring provider:  Julian Patricio MD   Referring providers clinic:  [facility]  Reason for visit/diagnosis:  Perforation of both tympanic membranes,  Left ear pain. Appt per pt. Recs in [facility].   ENT Audio done 11/29. Referred by Dr. Patricio. [facility] confirmed.     RECORDS REQUESTED FROM      Clinic name Comments Records Status Imaging Status   [facility] 11/29/23 referral/ OV with Julian Patricio MD     11/29/23 audiogram Epic             Detail Level: Zone Detail Level: Generalized Detail Level: Detailed Patient Specific Counseling (Will Not Stick From Patient To Patient): Patient stopped Otezla due to diarrhea.\\nContinue topical Clobetasol solution as needed

## 2024-02-01 ENCOUNTER — TELEPHONE (OUTPATIENT)
Dept: FAMILY MEDICINE | Facility: CLINIC | Age: 54
End: 2024-02-01
Payer: COMMERCIAL

## 2024-02-01 NOTE — TELEPHONE ENCOUNTER
Patient Quality Outreach    Patient is due for the following:   Colon Cancer Screening  Breast Cancer Screening - Mammogram    Next Steps:   Schedule a office visit for a mammogram and a colon cancer screening     Type of outreach:    Phone, left message for patient/parent to call back.      Questions for provider review:    None           Nila Loya, [facility]

## 2024-02-21 ENCOUNTER — PRE VISIT (OUTPATIENT)
Dept: OTOLARYNGOLOGY | Facility: CLINIC | Age: 54
End: 2024-02-21

## 2024-02-21 ENCOUNTER — OFFICE VISIT (OUTPATIENT)
Dept: OTOLARYNGOLOGY | Facility: CLINIC | Age: 54
End: 2024-02-21
Payer: COMMERCIAL

## 2024-02-21 VITALS
HEIGHT: 59 IN | SYSTOLIC BLOOD PRESSURE: 112 MMHG | DIASTOLIC BLOOD PRESSURE: 56 MMHG | WEIGHT: 110 LBS | TEMPERATURE: 96.7 F | OXYGEN SATURATION: 100 % | HEART RATE: 64 BPM | BODY MASS INDEX: 22.18 KG/M2

## 2024-02-21 DIAGNOSIS — H72.93 PERFORATION OF TYMPANIC MEMBRANE, BILATERAL: Primary | ICD-10-CM

## 2024-02-21 DIAGNOSIS — H92.11 OTORRHEA, RIGHT: ICD-10-CM

## 2024-02-21 PROCEDURE — 99000 SPECIMEN HANDLING OFFICE-LAB: CPT | Performed by: PATHOLOGY

## 2024-02-21 PROCEDURE — 99214 OFFICE O/P EST MOD 30 MIN: CPT | Mod: 25 | Performed by: OTOLARYNGOLOGY

## 2024-02-21 PROCEDURE — 87102 FUNGUS ISOLATION CULTURE: CPT | Performed by: OTOLARYNGOLOGY

## 2024-02-21 PROCEDURE — 87070 CULTURE OTHR SPECIMN AEROBIC: CPT | Performed by: OTOLARYNGOLOGY

## 2024-02-21 PROCEDURE — 92504 EAR MICROSCOPY EXAMINATION: CPT | Performed by: OTOLARYNGOLOGY

## 2024-02-21 ASSESSMENT — PAIN SCALES - GENERAL: PAINLEVEL: NO PAIN (0)

## 2024-02-21 NOTE — PROGRESS NOTES
Monet Finley is seen in consultation from Dr. Patricio.  She is a 53 year old female being seen for perforation of both tympanic membranes. She is here with her sister and a virtual . Her sister also provides some interpretation. She reports she is unsure how she developed the perforations in her ears. She does report that she did a lot of swimming when she was a child and had drainage whenever she would swim. She reports long standing hearing loss in both ears. Initially she said it was several years but then thought it might have been longer. She has had chronic bilateral otorrhea but reports she has not had any for some years. She has a lot of itching in her ears which is what bothers her the most now. She denies otalgia at this time. No vertigo.      Family History   Problem Relation Age of Onset    No Known Problems Mother     Substance Abuse Father         alcoholism    Unknown/Adopted Maternal Grandmother     Unknown/Adopted Maternal Grandfather     Unknown/Adopted Paternal Grandmother     Unknown/Adopted Paternal Grandfather     Heart Disease Brother     No Known Problems Sister     No Known Problems Daughter     No Known Problems Brother     No Known Problems Brother     No Known Problems Brother     No Known Problems Sister     No Known Problems Sister     No Known Problems Sister     No Known Problems Sister        Social History     Tobacco Use    Smoking status: Never    Smokeless tobacco: Never   Vaping Use    Vaping Use: Never used   Substance Use Topics    Alcohol use: Never    Drug use: Never       Physical examination:  Constitutional:  In no acute distress, appears stated age  Eyes:  Extraocular movements intact, no spontaneous nystagmus  Ears:  Both ears examined under the microscope.  Left: small central perforation with crusting around edges, removed with straight pick and alligator pick, 25% TM perforation was seen with thickened edges, middle ear mucosa clear, no retractions  seen. Right: subtotal TM perforation with thickening edges, no crusting seen, no annulus identified anteriorly or inferiorly, scar tissue around the incus and middle ear mucosa was somewhat thickened with mucus, which was cultured.  Respiratory:  No increased work of breathing, wheezing or stridor  Musculoskeletal:  Good upper extremity strength  Skin:  No rashes on the head and neck  Neurologic:  House Brackman 1/6 bilaterally, ambulating normally  Psychiatric:  Alert, normal affect, answering questions appropriately    Audiogram:  Audiogram on 11/29/23 was independently reviewed. Right severe mixed loss with normal downsloping to moderate at 2 and 3kHz upsloping to mild at 4kHz sensorineural loss with 80% speech discrimination. Left mild downsloping to severe/profound mixed loss with a likely mild to moderate sensorineural loss with 100% speech discrimination. Bilateral flat tympanograms.        Assessment and plan:  Monet Finley is seen in consultation from Dr. Patricio.  She is a 53 year old female being seen for perforation of both tympanic membranes. She  has a subtotal perforation on the right with inflamed middle ear mucosa and a small perforation on the left with a clean appearing middle ear. Collected a culture from the mucus present in the right ear. Will order a temporal bone CT scan to check for chronic mastoiditis which would necessitate mastoidectomy. Informed patient that she likely has chronic eustachian tube dysfunction and will likely need a t-tube insertion, following the closing of the perforation, to aerate the ear. Recommendation was made for right tympanoplasty with cartilage, possible tympanomastoidectomy as that is her worse hearing ear. The risks and benefits were discussed. The risks include but are not limited to:  Worsened hearing which may require further surgery, profound and irreversible hearing loss, dizziness, damage to the taste nerve, damage to the facial nerve, tympanic  membrane perforation requiring further surgery and infection. Postoperative restrictions were discussed. She and her sister had their questions answered and she would like to proceed. We will call with the CT results as that will determine what surgery would be performed.    Patient asked about appointing her sister as her healthcare proxy, given her english speaking is not as strong. Informed patient that someone will contact her with the information on how to go about the process.    Scribe Disclosure:   I, Karissa Moore, am serving as a scribe; to document services personally performed by Rebeca Young MD -based on data collection and the provider's statements to me.     Provider Disclosure:  I agree with above History, Review of Systems, Physical exam and Plan.  I have reviewed the content of the documentation and have edited it as needed. I have personally performed the services documented here and the documentation accurately represents those services and the decisions I have made.

## 2024-02-21 NOTE — PATIENT INSTRUCTIONS
You were seen in the ENT Clinic today by Dr. Young. If you have any questions or concerns after your appointment, please contact us (see below)      2.   CT Temporal bone.  3.   We will plan to contact you with the results of the culture.         How to Contact Us:  Send a [employer] message to your provider. Our team will respond to you via [employer] Occasionally, we will need to call you to get further information.  For urgent matters (Monday-Friday), call the ENT Clinic: [phone] and speak with a call center team member - they will route your call appropriately.   If you'd like to speak directly with a nurse, please find our contact information below. We do our best to check voicemail frequently throughout the day, and will work to call you back within 1-2 days. For urgent matters, please use the general clinic phone numbers listed above.      Ofe PENG RN  ENT RN Care Coordinator  Direct: [phone]    Jennifer KOEHLER LPN  Direct: [phone]

## 2024-02-21 NOTE — NURSING NOTE
"Chief Complaint   Patient presents with    Consult     Bilateral tympanic perforations     Blood pressure 112/56, pulse 64, temperature (!) 96.7  F (35.9  C), height 1.499 m (4' 11\"), weight 49.9 kg (110 lb), SpO2 100%.  Melchor Cramer LPN    "

## 2024-02-21 NOTE — LETTER
2/21/2024       RE: Monet Finley  [address] [address]     Dear Colleague,    Thank you for referring your patient, Monet Finley, to the [facility] [place] at [facility]. Please see a copy of my visit note below.    Monet Finley is seen in consultation from Dr. Patricio.  She is a 53 year old female being seen for perforation of both tympanic membranes. She is here with her sister and a virtual . Her sister also provides some interpretation. She reports she is unsure how she developed the perforations in her ears. She does report that she did a lot of swimming when she was a child and had drainage whenever she would swim. She reports long standing hearing loss in both ears. Initially she said it was several years but then thought it might have been longer. She has had chronic bilateral otorrhea but reports she has not had any for some years. She has a lot of itching in her ears which is what bothers her the most now. She denies otalgia at this time. No vertigo.      Family History   Problem Relation Age of Onset    No Known Problems Mother     Substance Abuse Father         alcoholism    Unknown/Adopted Maternal Grandmother     Unknown/Adopted Maternal Grandfather     Unknown/Adopted Paternal Grandmother     Unknown/Adopted Paternal Grandfather     Heart Disease Brother     No Known Problems Sister     No Known Problems Daughter     No Known Problems Brother     No Known Problems Brother     No Known Problems Brother     No Known Problems Sister     No Known Problems Sister     No Known Problems Sister     No Known Problems Sister        Social History     Tobacco Use    Smoking status: Never    Smokeless tobacco: Never   Vaping Use    Vaping Use: Never used   Substance Use Topics    Alcohol use: Never    Drug use: Never       Physical examination:  Constitutional:  In no acute distress, appears  stated age  Eyes:  Extraocular movements intact, no spontaneous nystagmus  Ears:  Both ears examined under the microscope.  Left: small central perforation with crusting around edges, removed with straight pick and alligator pick, 25% TM perforation was seen with thickened edges, middle ear mucosa clear, no retractions seen. Right: subtotal TM perforation with thickening edges, no crusting seen, no annulus identified anteriorly or inferiorly, scar tissue around the incus and middle ear mucosa was somewhat thickened with mucus, which was cultured.  Respiratory:  No increased work of breathing, wheezing or stridor  Musculoskeletal:  Good upper extremity strength  Skin:  No rashes on the head and neck  Neurologic:  House Brackman 1/6 bilaterally, ambulating normally  Psychiatric:  Alert, normal affect, answering questions appropriately    Audiogram:  Audiogram on 11/29/23 was independently reviewed. Right severe mixed loss with normal downsloping to moderate at 2 and 3kHz upsloping to mild at 4kHz sensorineural loss with 80% speech discrimination. Left mild downsloping to severe/profound mixed loss with a likely mild to moderate sensorineural loss with 100% speech discrimination. Bilateral flat tympanograms.        Assessment and plan:  Monet Finley is seen in consultation from Dr. Patricio.  She is a 53 year old female being seen for perforation of both tympanic membranes. She  has a subtotal perforation on the right with inflamed middle ear mucosa and a small perforation on the left with a clean appearing middle ear. Collected a culture from the mucus present in the right ear. Will order a temporal bone CT scan to check for chronic mastoiditis which would necessitate mastoidectomy. Informed patient that she likely has chronic eustachian tube dysfunction and will likely need a t-tube insertion, following the closing of the perforation, to aerate the ear. Recommendation was made for right tympanoplasty with  cartilage, possible tympanomastoidectomy as that is her worse hearing ear. The risks and benefits were discussed. The risks include but are not limited to:  Worsened hearing which may require further surgery, profound and irreversible hearing loss, dizziness, damage to the taste nerve, damage to the facial nerve, tympanic membrane perforation requiring further surgery and infection. Postoperative restrictions were discussed. She and her sister had their questions answered and she would like to proceed. We will call with the CT results as that will determine what surgery would be performed.    Patient asked about appointing her sister as her healthcare proxy, given her english speaking is not as strong. Informed patient that someone will contact her with the information on how to go about the process.    Scribe Disclosure:   I, Karissa Moore, am serving as a scribe; to document services personally performed by Rebeca Young MD -based on data collection and the provider's statements to me.     Provider Disclosure:  I agree with above History, Review of Systems, Physical exam and Plan.  I have reviewed the content of the documentation and have edited it as needed. I have personally performed the services documented here and the documentation accurately represents those services and the decisions I have made.      Rebeca Young MD

## 2024-02-24 LAB
BACTERIA SPEC CULT: ABNORMAL
BACTERIA SPEC CULT: ABNORMAL

## 2024-02-24 RX ORDER — DOXYCYCLINE HYCLATE 100 MG
100 TABLET ORAL 2 TIMES DAILY
Qty: 28 TABLET | Refills: 0 | Status: SHIPPED | OUTPATIENT
Start: 2024-02-24 | End: 2024-03-09

## 2024-02-24 RX ORDER — SULFACETAMIDE SODIUM 100 MG/ML
SOLUTION/ DROPS OPHTHALMIC
Qty: 5 ML | Refills: 1 | Status: SHIPPED | OUTPATIENT
Start: 2024-02-24

## 2024-02-26 ENCOUNTER — HOSPITAL ENCOUNTER (OUTPATIENT)
Dept: CT IMAGING | Facility: CLINIC | Age: 54
Discharge: HOME | End: 2024-02-26
Attending: OTOLARYNGOLOGY | Admitting: OTOLARYNGOLOGY
Payer: COMMERCIAL

## 2024-02-26 DIAGNOSIS — H72.93 PERFORATION OF TYMPANIC MEMBRANE, BILATERAL: ICD-10-CM

## 2024-02-26 PROCEDURE — 70480 CT ORBIT/EAR/FOSSA W/O DYE: CPT

## 2024-02-27 DIAGNOSIS — H70.11 MASTOIDITIS, CHRONIC, RIGHT: ICD-10-CM

## 2024-02-27 DIAGNOSIS — H90.6 MIXED CONDUCTIVE AND SENSORINEURAL HEARING LOSS OF BOTH EARS: ICD-10-CM

## 2024-02-27 DIAGNOSIS — H72.93 PERFORATION OF TYMPANIC MEMBRANE, BILATERAL: Primary | ICD-10-CM

## 2024-02-27 RX ORDER — ACETAMINOPHEN 325 MG/1
975 TABLET ORAL ONCE
Status: CANCELLED | OUTPATIENT
Start: 2024-02-27 | End: 2024-02-27

## 2024-02-27 RX ORDER — CEFAZOLIN SODIUM 2 G/50ML
2 SOLUTION INTRAVENOUS SEE ADMIN INSTRUCTIONS
Status: CANCELLED | OUTPATIENT
Start: 2024-02-27

## 2024-02-27 RX ORDER — DEXAMETHASONE SODIUM PHOSPHATE 4 MG/ML
10 INJECTION, SOLUTION INTRA-ARTICULAR; INTRALESIONAL; INTRAMUSCULAR; INTRAVENOUS; SOFT TISSUE ONCE
Status: CANCELLED | OUTPATIENT
Start: 2024-02-27 | End: 2024-02-27

## 2024-02-27 RX ORDER — CEFAZOLIN SODIUM 2 G/50ML
2 SOLUTION INTRAVENOUS
Status: CANCELLED | OUTPATIENT
Start: 2024-02-27

## 2024-02-28 ENCOUNTER — TELEPHONE (OUTPATIENT)
Dept: OTOLARYNGOLOGY | Facility: CLINIC | Age: 54
End: 2024-02-28
Payer: COMMERCIAL

## 2024-02-28 NOTE — TELEPHONE ENCOUNTER
Left patient a voicemail to schedule surgery for TYMPANOMASTOIDECTOMY, WITH FACIAL NERVE MONITORING, POSSIBLE CARTILAGE BACKING (Right) with Dr. Hector Manrique on 2/28/2024 at 11:50 AM

## 2024-03-01 NOTE — TELEPHONE ENCOUNTER
Left patient another voicemail to schedule surgery for TYMPANOMASTOIDECTOMY, WITH FACIAL NERVE MONITORING, POSSIBLE CARTILAGE BACKING (Right) with Dr. Hector Manrique, Perioperative Coordinator 3/1/2024 at 4:09 PM

## 2024-03-07 NOTE — TELEPHONE ENCOUNTER
Called patient to schedule TYMPANOMASTOIDECTOMY, WITH FACIAL NERVE MONITORING, POSSIBLE CARTILAGE BACKING (Right)  with Dr. Young. No answer and mailbox is full.     Sherice Guajardo on 3/7/2024 at 2:27 PM

## 2024-03-07 NOTE — TELEPHONE ENCOUNTER
Called patients sister, Virginia, to schedule TYMPANOMASTOIDECTOMY, WITH FACIAL NERVE MONITORING, POSSIBLE CARTILAGE BACKING (Right)  with Dr. Young. No answer, callback number [phone] left on  for patient.     Sherice Guajardo on 3/7/2024 at 3:25 PM

## 2024-03-11 PROBLEM — H72.93 PERFORATION OF TYMPANIC MEMBRANE, BILATERAL: Status: ACTIVE | Noted: 2024-02-27

## 2024-03-11 PROBLEM — H70.11: Status: ACTIVE | Noted: 2024-02-27

## 2024-03-11 PROBLEM — H90.6 MIXED CONDUCTIVE AND SENSORINEURAL HEARING LOSS OF BOTH EARS: Status: ACTIVE | Noted: 2024-02-27

## 2024-03-11 NOTE — TELEPHONE ENCOUNTER
Patient is scheduled for surgery with Dr. Young.     Spoke with: Patients sister, Virginia.     Date of Surgery: 5/22/2024    Location: [facility] OR     Pre op with Provider: LUCINDA    H&P: Patient will schedule at Virginia's primary clinic. Informed Virginia patients pre op will need to be done within 30 days of surgery date.     Additional imaging/appointments: Patient is scheduled for a 3 week post op with Dr. Young on 6/13/24 at 11:00 AM.     Surgery packet: Per Virginia's preference, will mail surgery packet as well as send through walkby. Informed Virginia arrival time for surgery will not be listed within packet.      Additional comments: Informed Virginia a pre op nurse will call 2-5 days prior to surgery to go over further details/give arrival time.         Sherice Guajardo on 3/11/2024 at 1:56 PM

## 2024-03-13 NOTE — TELEPHONE ENCOUNTER
Received a call from patients sister asking for a call back to discuss ear drops patient was prescribed. Patients sister said the bottle lists that drops are the for eyes and not ears and she wants to confirm they received correct item.     Sherice Guajardo on 3/13/2024 at 12:44 PM

## 2024-03-14 ENCOUNTER — TELEPHONE (OUTPATIENT)
Dept: OTOLARYNGOLOGY | Facility: CLINIC | Age: 54
End: 2024-03-14
Payer: COMMERCIAL

## 2024-03-15 ENCOUNTER — HOSPITAL ENCOUNTER (EMERGENCY)
Facility: CLINIC | Age: 54
Discharge: HOME | End: 2024-03-15
Attending: PHYSICIAN ASSISTANT | Admitting: PHYSICIAN ASSISTANT
Payer: COMMERCIAL

## 2024-03-15 VITALS
HEART RATE: 48 BPM | RESPIRATION RATE: 16 BRPM | DIASTOLIC BLOOD PRESSURE: 47 MMHG | SYSTOLIC BLOOD PRESSURE: 120 MMHG | OXYGEN SATURATION: 99 % | TEMPERATURE: 97.9 F

## 2024-03-15 DIAGNOSIS — H60.502 ACUTE OTITIS EXTERNA OF LEFT EAR, UNSPECIFIED TYPE: ICD-10-CM

## 2024-03-15 PROCEDURE — 99213 OFFICE O/P EST LOW 20 MIN: CPT | Performed by: PHYSICIAN ASSISTANT

## 2024-03-15 PROCEDURE — G0463 HOSPITAL OUTPT CLINIC VISIT: HCPCS | Performed by: PHYSICIAN ASSISTANT

## 2024-03-15 RX ORDER — OFLOXACIN 3 MG/ML
5 SOLUTION AURICULAR (OTIC) 2 TIMES DAILY
Qty: 5 ML | Refills: 0 | Status: SHIPPED | OUTPATIENT
Start: 2024-03-15 | End: 2024-03-22

## 2024-03-15 ASSESSMENT — COLUMBIA-SUICIDE SEVERITY RATING SCALE - C-SSRS
6. HAVE YOU EVER DONE ANYTHING, STARTED TO DO ANYTHING, OR PREPARED TO DO ANYTHING TO END YOUR LIFE?: NO
2. HAVE YOU ACTUALLY HAD ANY THOUGHTS OF KILLING YOURSELF IN THE PAST MONTH?: NO
1. IN THE PAST MONTH, HAVE YOU WISHED YOU WERE DEAD OR WISHED YOU COULD GO TO SLEEP AND NOT WAKE UP?: NO

## 2024-03-16 NOTE — ED PROVIDER NOTES
History     Chief Complaint   Patient presents with    Ear Problem     HPI  Monet Finley is a 53 year old female who presents to the urgent care with left ear itching and discomfort.  Sister states that patient has been dealing with chronic TM rupture and is scheduled to have surgical repair of the TMs through ENT in May.  Patient denies any drainage, pain, fevers or redness to the ear.    Allergies:  No Known Allergies    Problem List:    Patient Active Problem List    Diagnosis Date Noted    Perforation of tympanic membrane, bilateral 02/27/2024     Priority: Medium    Mixed conductive and sensorineural hearing loss of both ears 02/27/2024     Priority: Medium    Mastoiditis, chronic, right 02/27/2024     Priority: Medium        Past Medical History:    Past Medical History:   Diagnosis Date    No known health problems        Past Surgical History:    Past Surgical History:   Procedure Laterality Date    NO HISTORY OF SURGERY         Family History:    Family History   Problem Relation Age of Onset    No Known Problems Mother     Substance Abuse Father         alcoholism    Unknown/Adopted Maternal Grandmother     Unknown/Adopted Maternal Grandfather     Unknown/Adopted Paternal Grandmother     Unknown/Adopted Paternal Grandfather     Heart Disease Brother     No Known Problems Sister     No Known Problems Daughter     No Known Problems Brother     No Known Problems Brother     No Known Problems Brother     No Known Problems Sister     No Known Problems Sister     No Known Problems Sister     No Known Problems Sister        Social History:  Marital Status:   [2]  Social History     Tobacco Use    Smoking status: Never    Smokeless tobacco: Never   Vaping Use    Vaping Use: Never used   Substance Use Topics    Alcohol use: Never    Drug use: Never        Medications:    ofloxacin (FLOXIN) 0.3 % otic solution  sulfacetamide (BLEPH-10) 10 % ophthalmic solution          Review of Systems   HENT:  Positive  for ear pain. Negative for ear discharge.    All other systems reviewed and are negative.      Physical Exam   BP: 120/47  Pulse: (!) 48  Temp: 97.9  F (36.6  C)  Resp: 16  SpO2: 99 %      Physical Exam  Vitals and nursing note reviewed.   Constitutional:       Appearance: Normal appearance. She is normal weight.   HENT:      Head: Normocephalic and atraumatic.      Ears:      Comments: Left ear canal with erythema bilateral chronic TM ruptures with no drainage noted bilaterally.  No mastoid tenderness bilaterally.     Nose: Nose normal.      Mouth/Throat:      Mouth: Mucous membranes are moist.      Pharynx: Oropharynx is clear. No oropharyngeal exudate or posterior oropharyngeal erythema.   Eyes:      General: No scleral icterus.     Extraocular Movements: Extraocular movements intact.      Conjunctiva/sclera: Conjunctivae normal.      Pupils: Pupils are equal, round, and reactive to light.   Cardiovascular:      Rate and Rhythm: Normal rate and regular rhythm.      Heart sounds: Normal heart sounds.   Pulmonary:      Effort: Pulmonary effort is normal.      Breath sounds: Normal breath sounds.   Musculoskeletal:      Cervical back: Normal range of motion and neck supple.   Skin:     General: Skin is warm.      Capillary Refill: Capillary refill takes less than 2 seconds.   Neurological:      General: No focal deficit present.      Mental Status: She is alert and oriented to person, place, and time.   Psychiatric:         Mood and Affect: Mood normal.         Behavior: Behavior normal.         Thought Content: Thought content normal.         Judgment: Judgment normal.         ED Course        Procedures             Critical Care time:  none               No results found for this or any previous visit (from the past 24 hour(s)).    Medications - No data to display    Assessments & Plan (with Medical Decision Making)     I have reviewed the nursing notes.    I have reviewed the findings, diagnosis, plan and need  for follow up with the patient.   Monet Finley is a 53 year old female who presents to the urgent care with left ear itching and discomfort.  Sister states that patient has been dealing with chronic TM rupture and is scheduled to have surgical repair of the TMs through ENT in May.  Patient denies any drainage, pain, fevers or redness to the ear.    Exam findings consistent with possible early otitis externa.  Will treat with Floxin otic eardrops twice daily for 7 days.  No concerns for mastoiditis.    Discharge Medication List as of 3/15/2024  3:44 PM        START taking these medications    Details   ofloxacin (FLOXIN) 0.3 % otic solution Place 5 drops in ear(s) 2 times daily for 7 days, Disp-5 mL, R-0, E-Prescribe             Final diagnoses:   Acute otitis externa of left ear, unspecified type       3/15/2024   [facility] EMERGENCY DEPT       Deirdre Singer PA-C  03/15/24 [id number]

## 2024-03-20 LAB — BACTERIA SPEC CULT: NO GROWTH

## 2024-04-01 ENCOUNTER — TELEPHONE (OUTPATIENT)
Dept: OTOLARYNGOLOGY | Facility: CLINIC | Age: 54
End: 2024-04-01
Payer: COMMERCIAL

## 2024-04-01 NOTE — TELEPHONE ENCOUNTER
Left Voicemail (1st Attempt) for the patient to call back and schedule the following:    Appointment type: RTN  Provider: Hector  Return date: 7/3-7/11  Specialty phone number: direct  Additional appointment(s) needed: JOSHUA  Addhugo Notes: 2nd post op- DOS 5/22-6/7 week with JOSHUA- 7/3-7/11

## 2024-04-02 ENCOUNTER — TELEPHONE (OUTPATIENT)
Dept: OTOLARYNGOLOGY | Facility: CLINIC | Age: 54
End: 2024-04-02
Payer: COMMERCIAL

## 2024-04-02 NOTE — TELEPHONE ENCOUNTER
Patient confirmed scheduled appointment:  Date: 7/11  Time: 2:30  Provider: Hector   Location: [facility]   Testing/imaging: WIN prior  Additional notes: .

## 2024-05-21 ENCOUNTER — MYC MEDICAL ADVICE (OUTPATIENT)
Dept: SURGERY | Facility: AMBULATORY SURGERY CENTER | Age: 54
End: 2024-05-21
Payer: COMMERCIAL

## 2024-05-22 ENCOUNTER — HOSPITAL ENCOUNTER (OUTPATIENT)
Facility: AMBULATORY SURGERY CENTER | Age: 54
Discharge: HOME | End: 2024-05-22
Attending: OTOLARYNGOLOGY
Payer: COMMERCIAL

## 2024-05-22 ENCOUNTER — ANESTHESIA EVENT (OUTPATIENT)
Dept: SURGERY | Facility: AMBULATORY SURGERY CENTER | Age: 54
End: 2024-05-22
Payer: COMMERCIAL

## 2024-05-22 ENCOUNTER — TELEPHONE (OUTPATIENT)
Dept: SURGERY | Facility: CLINIC | Age: 54
End: 2024-05-22

## 2024-05-22 ENCOUNTER — ANESTHESIA (OUTPATIENT)
Dept: SURGERY | Facility: AMBULATORY SURGERY CENTER | Age: 54
End: 2024-05-22
Payer: COMMERCIAL

## 2024-05-22 VITALS
HEIGHT: 59 IN | HEART RATE: 59 BPM | DIASTOLIC BLOOD PRESSURE: 71 MMHG | WEIGHT: 110 LBS | SYSTOLIC BLOOD PRESSURE: 125 MMHG | BODY MASS INDEX: 22.18 KG/M2 | OXYGEN SATURATION: 98 % | RESPIRATION RATE: 16 BRPM | TEMPERATURE: 98 F

## 2024-05-22 DIAGNOSIS — H70.11 MASTOIDITIS, CHRONIC, RIGHT: ICD-10-CM

## 2024-05-22 DIAGNOSIS — G89.18 POSTOPERATIVE PAIN: ICD-10-CM

## 2024-05-22 DIAGNOSIS — H72.93 PERFORATION OF TYMPANIC MEMBRANE, BILATERAL: Primary | ICD-10-CM

## 2024-05-22 DIAGNOSIS — H90.6 MIXED CONDUCTIVE AND SENSORINEURAL HEARING LOSS OF BOTH EARS: ICD-10-CM

## 2024-05-22 PROCEDURE — 69643 REVISE MIDDLE EAR & MASTOID: CPT | Performed by: NURSE ANESTHETIST, CERTIFIED REGISTERED

## 2024-05-22 PROCEDURE — 88311 DECALCIFY TISSUE: CPT | Mod: 26 | Performed by: PATHOLOGY

## 2024-05-22 PROCEDURE — 69643 REVISE MIDDLE EAR & MASTOID: CPT | Performed by: ANESTHESIOLOGY

## 2024-05-22 PROCEDURE — 88304 TISSUE EXAM BY PATHOLOGIST: CPT | Mod: 26 | Performed by: PATHOLOGY

## 2024-05-22 PROCEDURE — 69643 REVISE MIDDLE EAR & MASTOID: CPT | Mod: RT

## 2024-05-22 PROCEDURE — 69643 REVISE MIDDLE EAR & MASTOID: CPT | Mod: RT | Performed by: OTOLARYNGOLOGY

## 2024-05-22 PROCEDURE — 88304 TISSUE EXAM BY PATHOLOGIST: CPT | Mod: TC | Performed by: OTOLARYNGOLOGY

## 2024-05-22 PROCEDURE — 88311 DECALCIFY TISSUE: CPT | Mod: TC | Performed by: OTOLARYNGOLOGY

## 2024-05-22 RX ORDER — LIDOCAINE 40 MG/G
CREAM TOPICAL
Status: DISCONTINUED | OUTPATIENT
Start: 2024-05-22 | End: 2024-05-22 | Stop reason: HOSPADM

## 2024-05-22 RX ORDER — NALOXONE HYDROCHLORIDE 0.4 MG/ML
0.1 INJECTION, SOLUTION INTRAMUSCULAR; INTRAVENOUS; SUBCUTANEOUS
Status: DISCONTINUED | OUTPATIENT
Start: 2024-05-22 | End: 2024-05-23 | Stop reason: HOSPADM

## 2024-05-22 RX ORDER — CEFAZOLIN SODIUM 2 G/50ML
2 SOLUTION INTRAVENOUS
Status: COMPLETED | OUTPATIENT
Start: 2024-05-22 | End: 2024-05-22

## 2024-05-22 RX ORDER — FENTANYL CITRATE 50 UG/ML
50 INJECTION, SOLUTION INTRAMUSCULAR; INTRAVENOUS EVERY 5 MIN PRN
Status: DISCONTINUED | OUTPATIENT
Start: 2024-05-22 | End: 2024-05-23 | Stop reason: HOSPADM

## 2024-05-22 RX ORDER — CEFAZOLIN SODIUM 2 G/50ML
2 SOLUTION INTRAVENOUS SEE ADMIN INSTRUCTIONS
Status: DISCONTINUED | OUTPATIENT
Start: 2024-05-22 | End: 2024-05-22 | Stop reason: HOSPADM

## 2024-05-22 RX ORDER — HYDROMORPHONE HYDROCHLORIDE 1 MG/ML
0.2 INJECTION, SOLUTION INTRAMUSCULAR; INTRAVENOUS; SUBCUTANEOUS EVERY 5 MIN PRN
Status: DISCONTINUED | OUTPATIENT
Start: 2024-05-22 | End: 2024-05-23 | Stop reason: HOSPADM

## 2024-05-22 RX ORDER — FENTANYL CITRATE 50 UG/ML
INJECTION, SOLUTION INTRAMUSCULAR; INTRAVENOUS PRN
Status: DISCONTINUED | OUTPATIENT
Start: 2024-05-22 | End: 2024-05-22

## 2024-05-22 RX ORDER — ACETAMINOPHEN 325 MG/1
650 TABLET ORAL
Status: DISCONTINUED | OUTPATIENT
Start: 2024-05-22 | End: 2024-05-23 | Stop reason: HOSPADM

## 2024-05-22 RX ORDER — ONDANSETRON 4 MG/1
4 TABLET, ORALLY DISINTEGRATING ORAL EVERY 30 MIN PRN
Status: DISCONTINUED | OUTPATIENT
Start: 2024-05-22 | End: 2024-05-23 | Stop reason: HOSPADM

## 2024-05-22 RX ORDER — ACETAMINOPHEN 325 MG/1
975 TABLET ORAL ONCE
Status: DISCONTINUED | OUTPATIENT
Start: 2024-05-22 | End: 2024-05-22 | Stop reason: HOSPADM

## 2024-05-22 RX ORDER — OXYCODONE HYDROCHLORIDE 5 MG/1
5 TABLET ORAL
Status: DISCONTINUED | OUTPATIENT
Start: 2024-05-22 | End: 2024-05-23 | Stop reason: HOSPADM

## 2024-05-22 RX ORDER — DEXAMETHASONE SODIUM PHOSPHATE 10 MG/ML
4 INJECTION, SOLUTION INTRAMUSCULAR; INTRAVENOUS
Status: DISCONTINUED | OUTPATIENT
Start: 2024-05-22 | End: 2024-05-23 | Stop reason: HOSPADM

## 2024-05-22 RX ORDER — DEXAMETHASONE SODIUM PHOSPHATE 10 MG/ML
10 INJECTION, SOLUTION INTRAMUSCULAR; INTRAVENOUS ONCE
Status: COMPLETED | OUTPATIENT
Start: 2024-05-22 | End: 2024-05-22

## 2024-05-22 RX ORDER — FENTANYL CITRATE 50 UG/ML
25 INJECTION, SOLUTION INTRAMUSCULAR; INTRAVENOUS EVERY 5 MIN PRN
Status: DISCONTINUED | OUTPATIENT
Start: 2024-05-22 | End: 2024-05-23 | Stop reason: HOSPADM

## 2024-05-22 RX ORDER — ONDANSETRON 2 MG/ML
4 INJECTION INTRAMUSCULAR; INTRAVENOUS EVERY 30 MIN PRN
Status: DISCONTINUED | OUTPATIENT
Start: 2024-05-22 | End: 2024-05-23 | Stop reason: HOSPADM

## 2024-05-22 RX ORDER — SODIUM CHLORIDE, SODIUM LACTATE, POTASSIUM CHLORIDE, CALCIUM CHLORIDE 600; 310; 30; 20 MG/100ML; MG/100ML; MG/100ML; MG/100ML
INJECTION, SOLUTION INTRAVENOUS CONTINUOUS
Status: DISCONTINUED | OUTPATIENT
Start: 2024-05-22 | End: 2024-05-23 | Stop reason: HOSPADM

## 2024-05-22 RX ORDER — OFLOXACIN 3 MG/ML
5 SOLUTION AURICULAR (OTIC) 2 TIMES DAILY
Qty: 10 ML | Refills: 0 | Status: SHIPPED | OUTPATIENT
Start: 2024-05-29 | End: 2024-06-13

## 2024-05-22 RX ORDER — LIDOCAINE HYDROCHLORIDE 20 MG/ML
INJECTION, SOLUTION INFILTRATION; PERINEURAL PRN
Status: DISCONTINUED | OUTPATIENT
Start: 2024-05-22 | End: 2024-05-22

## 2024-05-22 RX ORDER — OXYCODONE HYDROCHLORIDE 5 MG/1
10 TABLET ORAL
Status: DISCONTINUED | OUTPATIENT
Start: 2024-05-22 | End: 2024-05-23 | Stop reason: HOSPADM

## 2024-05-22 RX ORDER — ONDANSETRON 2 MG/ML
INJECTION INTRAMUSCULAR; INTRAVENOUS PRN
Status: DISCONTINUED | OUTPATIENT
Start: 2024-05-22 | End: 2024-05-22

## 2024-05-22 RX ORDER — HYDROCODONE BITARTRATE AND ACETAMINOPHEN 5; 325 MG/1; MG/1
1 TABLET ORAL
Status: DISCONTINUED | OUTPATIENT
Start: 2024-05-22 | End: 2024-05-23 | Stop reason: HOSPADM

## 2024-05-22 RX ORDER — GLYCOPYRROLATE 0.2 MG/ML
INJECTION, SOLUTION INTRAMUSCULAR; INTRAVENOUS PRN
Status: DISCONTINUED | OUTPATIENT
Start: 2024-05-22 | End: 2024-05-22

## 2024-05-22 RX ORDER — PROPOFOL 10 MG/ML
INJECTION, EMULSION INTRAVENOUS CONTINUOUS PRN
Status: DISCONTINUED | OUTPATIENT
Start: 2024-05-22 | End: 2024-05-22

## 2024-05-22 RX ORDER — FENTANYL CITRATE 50 UG/ML
25 INJECTION, SOLUTION INTRAMUSCULAR; INTRAVENOUS
Status: DISCONTINUED | OUTPATIENT
Start: 2024-05-22 | End: 2024-05-23 | Stop reason: HOSPADM

## 2024-05-22 RX ORDER — HYDROMORPHONE HYDROCHLORIDE 1 MG/ML
0.4 INJECTION, SOLUTION INTRAMUSCULAR; INTRAVENOUS; SUBCUTANEOUS EVERY 5 MIN PRN
Status: DISCONTINUED | OUTPATIENT
Start: 2024-05-22 | End: 2024-05-23 | Stop reason: HOSPADM

## 2024-05-22 RX ORDER — ACETAMINOPHEN 325 MG/1
975 TABLET ORAL ONCE
Status: COMPLETED | OUTPATIENT
Start: 2024-05-22 | End: 2024-05-22

## 2024-05-22 RX ORDER — PROPOFOL 10 MG/ML
INJECTION, EMULSION INTRAVENOUS PRN
Status: DISCONTINUED | OUTPATIENT
Start: 2024-05-22 | End: 2024-05-22

## 2024-05-22 RX ORDER — HYDROCODONE BITARTRATE AND ACETAMINOPHEN 5; 325 MG/1; MG/1
1 TABLET ORAL EVERY 6 HOURS PRN
Qty: 12 TABLET | Refills: 0 | Status: SHIPPED | OUTPATIENT
Start: 2024-05-22 | End: 2024-05-25

## 2024-05-22 RX ORDER — SODIUM CHLORIDE, SODIUM LACTATE, POTASSIUM CHLORIDE, CALCIUM CHLORIDE 600; 310; 30; 20 MG/100ML; MG/100ML; MG/100ML; MG/100ML
INJECTION, SOLUTION INTRAVENOUS CONTINUOUS
Status: DISCONTINUED | OUTPATIENT
Start: 2024-05-22 | End: 2024-05-22 | Stop reason: HOSPADM

## 2024-05-22 RX ADMIN — PROPOFOL 100 MCG/KG/MIN: 10 INJECTION, EMULSION INTRAVENOUS at 11:33

## 2024-05-22 RX ADMIN — FENTANYL CITRATE 50 MCG: 50 INJECTION, SOLUTION INTRAMUSCULAR; INTRAVENOUS at 12:03

## 2024-05-22 RX ADMIN — ONDANSETRON 4 MG: 2 INJECTION INTRAMUSCULAR; INTRAVENOUS at 11:30

## 2024-05-22 RX ADMIN — PROPOFOL 100 MCG/KG/MIN: 10 INJECTION, EMULSION INTRAVENOUS at 10:53

## 2024-05-22 RX ADMIN — ACETAMINOPHEN 975 MG: 325 TABLET ORAL at 08:12

## 2024-05-22 RX ADMIN — GLYCOPYRROLATE 0.2 MG: 0.2 INJECTION, SOLUTION INTRAMUSCULAR; INTRAVENOUS at 09:39

## 2024-05-22 RX ADMIN — SODIUM CHLORIDE, SODIUM LACTATE, POTASSIUM CHLORIDE, CALCIUM CHLORIDE: 600; 310; 30; 20 INJECTION, SOLUTION INTRAVENOUS at 08:12

## 2024-05-22 RX ADMIN — LIDOCAINE HYDROCHLORIDE 60 MG: 20 INJECTION, SOLUTION INFILTRATION; PERINEURAL at 09:14

## 2024-05-22 RX ADMIN — DEXAMETHASONE SODIUM PHOSPHATE 10 MG: 10 INJECTION, SOLUTION INTRAMUSCULAR; INTRAVENOUS at 09:39

## 2024-05-22 RX ADMIN — OXYCODONE HYDROCHLORIDE 5 MG: 5 TABLET ORAL at 13:30

## 2024-05-22 RX ADMIN — CEFAZOLIN SODIUM 2 G: 2 SOLUTION INTRAVENOUS at 09:13

## 2024-05-22 RX ADMIN — PROPOFOL 150 MG: 10 INJECTION, EMULSION INTRAVENOUS at 09:14

## 2024-05-22 RX ADMIN — PROPOFOL 30 MG: 10 INJECTION, EMULSION INTRAVENOUS at 12:09

## 2024-05-22 RX ADMIN — FENTANYL CITRATE 25 MCG: 50 INJECTION, SOLUTION INTRAMUSCULAR; INTRAVENOUS at 12:46

## 2024-05-22 RX ADMIN — PROPOFOL 50 MG: 10 INJECTION, EMULSION INTRAVENOUS at 11:58

## 2024-05-22 RX ADMIN — PROPOFOL 100 MCG/KG/MIN: 10 INJECTION, EMULSION INTRAVENOUS at 09:14

## 2024-05-22 RX ADMIN — FENTANYL CITRATE 25 MCG: 50 INJECTION, SOLUTION INTRAMUSCULAR; INTRAVENOUS at 12:53

## 2024-05-22 RX ADMIN — FENTANYL CITRATE 50 MCG: 50 INJECTION, SOLUTION INTRAMUSCULAR; INTRAVENOUS at 09:14

## 2024-05-22 NOTE — ANESTHESIA CARE TRANSFER NOTE
Patient: Monet Finley    Procedure: Procedure(s):  RIGHT TYMPANOMASTOIDECTOMY, WITH FACIAL NERVE MONITORING, CARTILAGE BACKING       Diagnosis: Perforation of tympanic membrane, bilateral [H72.93]  Mixed conductive and sensorineural hearing loss of both ears [H90.6]  Mastoiditis, chronic, right [H70.11]  Diagnosis Additional Information: No value filed.    Anesthesia Type:   General     Note:    Oropharynx: oropharynx clear of all foreign objects and spontaneously breathing  Level of Consciousness: awake and drowsy  Oxygen Supplementation: room air    Independent Airway: airway patency satisfactory and stable  Dentition: dentition unchanged  Vital Signs Stable: post-procedure vital signs reviewed and stable  Report to RN Given: handoff report given  Patient transferred to: PACU    Handoff Report: Identifed the Patient, Identified the Reponsible Provider, Reviewed the pertinent medical history, Discussed the surgical course, Reviewed Intra-OP anesthesia mangement and issues during anesthesia, Set expectations for post-procedure period and Allowed opportunity for questions and acknowledgement of understanding      Vitals:  Vitals Value Taken Time   /58 05/22/24 1224   Temp 36.5  C (97.7  F) 05/22/24 1224   Pulse 76 05/22/24 1226   Resp 15 05/22/24 1226   SpO2 97 % 05/22/24 1226   Vitals shown include unfiled device data.    Electronically Signed By: VALORIE Livingston CRNA  May 22, 2024  12:27 PM

## 2024-05-22 NOTE — BRIEF OP NOTE
[facility] Brief Operative Note    Pre-operative diagnosis: Perforation of tympanic membrane, bilateral [H72.93]  Mixed conductive and sensorineural hearing loss of both ears [H90.6]  Mastoiditis, chronic, right [H70.11]   Post-operative diagnosis As above   Procedure: Procedure(s):  RIGHT TYMPANOMASTOIDECTOMY, WITH FACIAL NERVE MONITORING, CARTILAGE BACKING   Surgeon(s): Surgeons and Role:     * Rebeca Young MD - Primary     * Nancy Chan MD - Fellow - Assisting   Estimated blood loss: 20 mL    Specimens: ID Type Source Tests Collected by Time Destination   1 : Left middle ear and mastoid contents Tissue Ear, Left SURGICAL PATHOLOGY EXAM Rebeca Young MD 5/22/2024 10:14 AM       Findings: Poorly pneumatized and poorly aerated mastoid. Extensive granulation tissue present in the mesotympanum, epitympanum, and antrum, wrapping around the ossicular chain. There is some erosion of the long process of the incus but the ossicular chain was intact and mobile. 80% marginal TM perforation. Reconstruction with loose areolar tissue graft and conchal cartilage.

## 2024-05-22 NOTE — OP NOTE
Date of service:  5/22/24    Preoperative diagnosis:  Right tympanic membrane perforation and chronic mastoiditis    Postoperative diagnosis:  Right tympanic membrane perforation and chronic mastoiditis    Procedures:  1.  Right tympanomastoidectomy with cartilage backing  2.  Facial nerve monitoring x 2.5 hours    Surgeon:  Rebeca Young MD    Fellow:  Nancy Chan MD    Anesthesia:  General endotracheal    EBL:  20 cc    Specimens:  right mastoid and middle ear contents    Drains:  none    Complications:  None    Findings:  Thick firm tissue within the antrum, epitympanum and mesotympanum surrounding the entire ossicular chain with thick mucosa overlying the remainder of the floor of the middle ear, ossicular chain intact and mobile.    Indications:  Monet Finley is a 53 year old female who was found to have a right TM perforation and chronic mastoiditis.  Risks and benefits of the above procedure were had with the patient and informed consent was obtained in the clinic.  This was confirmed in the preoperative area.    Procedure:  The patient was brought into the operating room and placed supine on the operating room table.  A brief had been performed already.  General anesthesia was induced and endotracheal intubation was performed.  The patient was turned 180 degrees.  The area behind the right ear was shaved and marked.  1:100,000 epinephrine was injected into the planned incision as well as the ear canal.  Facial nerve monitor electrodes were placed on the right face and connected to the nerve monitor.  Impedances were checked and a tap test was performed.  The monitor was used for the entirety of the case.  The patient was then prepped and draped in standard surgical fashion.  Time Out was performed with identification of the patient, side of the procedure and procedures to be done.    Postauricular incision was made down to the temporalis muscle.  Loose areolar temporalis fascia graft was obtained  and prepared for grafting purposes.  Standard T-incision was made.  The soft tissues were elevated off the mastoid cortex to the posterior ear canal.  Conchal bowl cartilage was harvested and prepared on the back table for grafting.     Simple mastoidectomy was performed with identification of the sigmoid, tegmen and posterior ear canal wall. The mastoid is noted to be fairly sclerotic with no lateral air cells. The operating microscope was brought into position and used for the remainder of the case.  The antrum was identified. It was filled with thick firm tissue which was removed. The short process of the incus was identified covered in the firm tissue. The tissue was taken off the short process of the incus. There was noted to be a large amount of tissue medial to the short process. As this was being freed from the floor of the antrum and the short process, there was noted to be a large plaque of tympanosclerosis within the tissue. This was all removed. The epitympanum was opened and there was tissue around the head of the malleus. This was also removed. All tissue was sent to pathology. Once the tissue was removed from the incus and malleus, this portion of the chain was noted to be mobile. The lateral semicircular canal was not well visualized as there appeared to be bony spicules overlying it. A stapes curette was used to remove the bony spicules as it was near the short process of the incus. As this was being done, a large piece of medial mastoid bone was lifted off the floor of the mastoid, revealing the semicircular canal. There was a layer of the thick mucosal tissue between the piece of bone and the floor.    Attention was turned to the posterior ear canal skin.  This was elevated medially towards the annulus.  Canal incisions were made and the tympanic membrane was inspected. There is a near total perforation with a small amount of TM remaining superiorly but an otherwise marginal perforation around the  remainder of the TM with no identifiable annulus. There was also tympanosclerosis surrounding the long process of the malleus. This was taken off the long process. The tympanomeatal flap was elevated. No superior or inferior canal incisions were made as the flap was quite narrow. The remnant tympanic membrane was elevated in an inferior to superior fashion. The middle ear mucosa is thick and required sharp dissection to free it from the annulus. The posterior annular ligament was present. As this was elevated superiorly, the chorda tympani was identified and encased in firm tissue. It was freed from the tissue and preserved. There was thick mucosa overlying the promontory which was firm and did not appear erythematous or hypervascular so it was left in place. There was thick tissue overlying the entire ossicular chain. This was freed from the ossicular chain and removed. The end of the long process of the incus was noted to be eroded but still attached to the stapes capitulum. The stapes is partially visualized as there was still firm mucosa overlying it. Once the tissue had been taken off the ossicular chain, the chain is noted to be intact and mobile.  The middle ear and mastoid were irrigated with normal saline. There was flow of saline between the antrum and the middle ear.      The anterior middle ear was filled with gelfoam. The fascia graft was used in an underlay fashion to repair the tympanic membrane. The graft and flap were elevated and the posterior middle ear was filled with gelfoam. A cartilage graft was placed over the posterior half of the middle ear. The graft and tympanomeatal flap were placed back in anatomic position.  Gelfoam was placed on top of the repair.  The T-incision was closed and the postauricular incision closed in multiple layers.  The ear canal was inspected under the microscope and the lateral flap put back into position.  The ear canal was then filled with gelfoam.    The patient  tolerated the procedure well and all counts were correct.  The facial nerve electrodes were removed and a [place] dressing placed.  The patient was then returned to anesthesia, awakened, extubated and taken to the PACU in stable condition.    Rebeca SHEPPARD MD, was scrubbed during the entire procedure.

## 2024-05-22 NOTE — DISCHARGE INSTRUCTIONS
"DISCHARGE INSTRUCTIONS:     1. Resume your home medications. Take pain medication, Tylenol or ibuprofen as needed. Use docusate to avoid constipation. Start your ear drops in 1 week after surgery and use until your follow up.    2. Wound care  * You can remove your head dressing tomorrow.   * Change the cotton ball in your ear as needed for drainage.  It's normal to expect some drainage from your ear for the first few days after surgery.    3. No shower until 48 hours after surgery. Keep incision clean and dry, do not scrub, let water and soap run over incision.  Use a cotton ball coated with vasoline to keep water out of ear canal.    4. For the next week, no heavy lifting more than 5 pounds, no strenuous activities. No nose blowing. Sneeze with your mouth open.    5. Please call MD or come to the ED for shortness of breath, trouble breathing, inability to tolerate liquids, intractable dizziness, or signs of infection such as fevers or redness.      Call the [phone] clinic number if you have any questions and concerns during the day and call [phone] at night and ask for \"ENT resident on call\".     6. Follow up with Dr. Young as previously scheduled.      [facility]  Home Care Following Anesthesia  For 24 hours after surgery:  Get plenty of rest.  A responsible adult must stay with you for at least 24 hours after you leave the surgery center.  Do not drive or use heavy equipment.  If you have weakness or tingling, don't drive or use heavy equipment until this feeling goes away.   Do not drink alcohol.   Avoid strenuous or risky activities.  Ask for help when climbing stairs.  You may feel lightheaded.  IF so, sit for a few minutes before standing.  Have someone help you get up.   If you have nausea (feel sick to your stomach): Drink only clear liquids such as apple juice, ginger ale, broth or 7-Up.  Rest may also help.  Be sure to drink enough fluids.  Move to a " regular diet as you feel able.   You may have a slight fever.  Call the doctor if your fever is over 100 F (37.7 C) (taken under the tongue) or lasts longer than 24 hours.  You may have a dry mouth, a sore throat, muscle aches or trouble sleeping. These should go away after 24 hours.  Do not make important or legal decisions.   It is recommended to avoid smoking.          Tips for taking pain medications  To get the best pain relief possible, remember these points:  Take pain medications as directed, before pain becomes severe.  Pain medication can upset your stomach: taking it with food may help.  Constipation is a common side effect of pain medication. Drink plenty of  fluids.  Eat foods high in fiber. Take a stool softener if recommended by your doctor or pharmacist.  Do not drink alcohol, drive or operate machinery while taking pain medications.  Ask about other ways to control pain, such as with heat, ice or relaxation.    Tylenol/Acetaminophen Consumption    If you feel your pain relief is insufficient, you may take Tylenol/Acetaminophen in addition to your narcotic pain medication.   Be careful not to exceed 4,000 mg of Tylenol/Acetaminophen in a 24 hour period from all sources.  If you are taking extra strength Tylenol/acetaminophen (500 mg), the maximum dose is 8 tablets in 24 hours.  If you are taking regular strength acetaminophen (325 mg), the maximum dose is 12 tablets in 24 hours.  Acetaminophen (Tylenol) 975 mg given at 8:15a.      Call a doctor for any of the following:  Signs of infection (fever, growing tenderness at the surgery site, a large amount of drainage or bleeding, severe pain, foul-smelling drainage, redness, swelling).  It has been over 8 to 10 hours since surgery and you are still not able to urinate (pass water).  Headache for over 24 hours.  Numbness, tingling or weakness the day after surgery (if you had spinal anesthesia).  Signs of Covid-19 infection (temperature over 100 degrees,  shortness of breath, cough, loss of taste/smell, generalized body aches, persistent headache, chills, sore throat, nausea/vomiting/diarrhea)  Your doctor is:  Dr. Rebeca Young, ENT Otolaryngology: [phone]                    Or dial [phone] and ask for the resident on call for:  ENT Otolaryngology  For emergency care, call the:  [facility]:  [phone] (TTY for hearing impaired: [phone])

## 2024-05-22 NOTE — ANESTHESIA POSTPROCEDURE EVALUATION
Patient: Monet Finley    Procedure: Procedure(s):  RIGHT TYMPANOMASTOIDECTOMY, WITH FACIAL NERVE MONITORING, CARTILAGE BACKING       Anesthesia Type:  General    Note:  Disposition: Outpatient   Postop Pain Control: Uneventful            Sign Out: Well controlled pain   PONV: No   Neuro/Psych: Uneventful            Sign Out: Acceptable/Baseline neuro status   Airway/Respiratory: Uneventful            Sign Out: Acceptable/Baseline resp. status   CV/Hemodynamics: Uneventful            Sign Out: Acceptable CV status; No obvious hypovolemia; No obvious fluid overload   Other NRE:    DID A NON-ROUTINE EVENT OCCUR?            Last vitals:  Vitals Value Taken Time   /66 05/22/24 1300   Temp 36.7  C (98  F) 05/22/24 1300   Pulse 59 05/22/24 1300   Resp 14 05/22/24 1300   SpO2 97 % 05/22/24 1300       Electronically Signed By: Oni Shen MD  May 22, 2024  2:39 PM

## 2024-05-22 NOTE — ANESTHESIA PREPROCEDURE EVALUATION
"Anesthesia Pre-Procedure Evaluation    Patient: Monet Finley   MRN: [id number] : 1970        Procedure : Procedure(s):  RIGHT TYMPANOMASTOIDECTOMY, WITH FACIAL NERVE MONITORING, POSSIBLE CARTILAGE BACKING          Past Medical History:   Diagnosis Date    No known health problems       Past Surgical History:   Procedure Laterality Date    NO HISTORY OF SURGERY        No Known Allergies   Social History     Tobacco Use    Smoking status: Never    Smokeless tobacco: Never   Substance Use Topics    Alcohol use: Never      Wt Readings from Last 1 Encounters:   24 49.9 kg (110 lb)        Anesthesia Evaluation            ROS/MED HX  ENT/Pulmonary:  - neg pulmonary ROS     Neurologic:  - neg neurologic ROS     Cardiovascular:  - neg cardiovascular ROS     METS/Exercise Tolerance: >4 METS    Hematologic:  - neg hematologic  ROS     Musculoskeletal:  - neg musculoskeletal ROS     GI/Hepatic:  - neg GI/hepatic ROS     Renal/Genitourinary:  - neg Renal ROS     Endo:  - neg endo ROS     Psychiatric/Substance Use:  - neg psychiatric ROS     Infectious Disease:  - neg infectious disease ROS     Malignancy:       Other:            Physical Exam    Airway  airway exam normal      Mallampati: II       Respiratory Devices and Support         Dental       (+) Minor Abnormalities - some fillings, tiny chips      Cardiovascular   cardiovascular exam normal          Pulmonary   pulmonary exam normal                OUTSIDE LABS:  CBC:   Lab Results   Component Value Date    WBC 5.6 2022    HGB 13.5 2022    HCT 40.0 2022     2022     BMP:   Lab Results   Component Value Date     2022    POTASSIUM 3.8 2022    CHLORIDE 104 2022    CO2 29 2022    BUN 16 2022    CR 0.87 2022    GLC 96 2022     COAGS: No results found for: \"PTT\", \"INR\", \"FIBR\"  POC: No results found for: \"BGM\", \"HCG\", \"HCGS\"  HEPATIC:   Lab Results   Component Value Date    ALBUMIN " 4.0 08/29/2022    PROTTOTAL 8.2 08/29/2022    ALT 36 08/29/2022    AST 26 08/29/2022    ALKPHOS 49 08/29/2022    BILITOTAL 0.6 08/29/2022     OTHER:   Lab Results   Component Value Date    A1C 5.5 08/29/2022    HAILEY 8.7 08/29/2022    TSH 3.06 08/29/2022       Anesthesia Plan    ASA Status:  1    NPO Status:  NPO Appropriate    Anesthesia Type: General.     - Airway: ETT   Induction: Intravenous, Propofol.   Maintenance: TIVA.   Techniques and Equipment:       - Drips/Meds: Remifentanil     Consents    Anesthesia Plan(s) and associated risks, benefits, and realistic alternatives discussed. Questions answered and patient/representative(s) expressed understanding.     - Discussed: Risks, Benefits and Alternatives for the PROCEDURE were discussed     - Discussed with:  Patient            Postoperative Care    Pain management: Oral pain medications, IV analgesics, Multi-modal analgesia.   PONV prophylaxis: Ondansetron (or other 5HT-3), Dexamethasone or Solumedrol, Background Propofol Infusion     Comments:               Kadeem Jara MD    I have reviewed the pertinent notes and labs in the chart from the past 30 days and (re)examined the patient.  Any updates or changes from those notes are reflected in this note.

## 2024-05-22 NOTE — ANESTHESIA PROCEDURE NOTES
Airway       Patient location during procedure: OR       Procedure Start/Stop Times: 5/22/2024 9:17 AM  Staff -        CRNA: Eladia Jamison APRN CRNA       Performed By: CRNAIndications and Patient Condition       Indications for airway management: jad-procedural       Induction type:intravenous       Mask difficulty assessment: 1 - vent by mask    Final Airway Details       Final airway type: endotracheal airway       Successful airway: ETT - single and Oral  Endotracheal Airway Details        ETT size (mm): 6.0       Cuffed: yes       Cuff volume (mL): 6       Successful intubation technique: video laryngoscopy       VL Blade Size: Glidescope 3 (for teaching purposes)       Grade View of Cords: 1       Adjucts: stylet       Position: Left       Measured from: lips       Secured at (cm): 20    Post intubation assessment        Placement verified by: capnometry, equal breath sounds and chest rise        Number of attempts at approach: 1       Secured with: tape       Ease of procedure: easy       Dentition: Intact and Unchanged    Medication(s) Administered   Medication Administration Time: 5/22/2024 9:17 AM

## 2024-05-23 NOTE — TELEPHONE ENCOUNTER
Received a page from Monet Finley's sister, Virginia. Per chart review, Monet underwent a right tympanomastoidectomy with repair of a right marginal tympanic membrane perforation with reconstruction with loose areolar tissue graft and conchal cartilage earlier today. Virginia reports Monet has blood tinged drainage on the gauze of her Magda dressing. She notes it is on ~1/3 of the gauze and wonders if this is ok. Per her description this appears to be within normal post operative expectations. She is encouraged to leave her Magda dressing in place for 24 hours. Return to ED precautions reviewed.     Rena Galvan MD on 5/22/2024 at 7:10 PM

## 2024-05-24 LAB
PATH REPORT.COMMENTS IMP SPEC: NORMAL
PATH REPORT.COMMENTS IMP SPEC: NORMAL
PATH REPORT.FINAL DX SPEC: NORMAL
PATH REPORT.GROSS SPEC: NORMAL
PATH REPORT.MICROSCOPIC SPEC OTHER STN: NORMAL
PATH REPORT.RELEVANT HX SPEC: NORMAL
PHOTO IMAGE: NORMAL

## 2024-06-13 ENCOUNTER — OFFICE VISIT (OUTPATIENT)
Dept: OTOLARYNGOLOGY | Facility: CLINIC | Age: 54
End: 2024-06-13
Payer: COMMERCIAL

## 2024-06-13 VITALS
DIASTOLIC BLOOD PRESSURE: 82 MMHG | SYSTOLIC BLOOD PRESSURE: 149 MMHG | HEART RATE: 75 BPM | WEIGHT: 108 LBS | HEIGHT: 59 IN | BODY MASS INDEX: 21.77 KG/M2

## 2024-06-13 DIAGNOSIS — H90.6 MIXED CONDUCTIVE AND SENSORINEURAL HEARING LOSS OF BOTH EARS: ICD-10-CM

## 2024-06-13 DIAGNOSIS — H70.11 MASTOIDITIS, CHRONIC, RIGHT: Primary | ICD-10-CM

## 2024-06-13 DIAGNOSIS — H72.92 PERFORATION OF LEFT TYMPANIC MEMBRANE: ICD-10-CM

## 2024-06-13 PROCEDURE — 99024 POSTOP FOLLOW-UP VISIT: CPT | Mod: GC | Performed by: OTOLARYNGOLOGY

## 2024-06-13 RX ORDER — OFLOXACIN 3 MG/ML
5 SOLUTION AURICULAR (OTIC) 2 TIMES DAILY
Qty: 10 ML | Refills: 1 | Status: SHIPPED | OUTPATIENT
Start: 2024-06-13

## 2024-06-13 RX ORDER — PREDNISOLONE ACETATE 10 MG/ML
4 SUSPENSION/ DROPS OPHTHALMIC 2 TIMES DAILY
Qty: 10 ML | Refills: 1 | Status: SHIPPED | OUTPATIENT
Start: 2024-06-13

## 2024-06-13 NOTE — PATIENT INSTRUCTIONS
You were seen in the ENT Clinic today by Dr. Young. If you have any questions or concerns after your appointment, please contact us (see below)    The following has been recommended for you based upon your appointment today:  Refilled floxin and added pred-forte to be used til next appt  Filled out your paperwork    Please return to clinic as scheduled    How to Contact Us:  Send a [employer] message to your provider. Our team will respond to you via [employer]. Occasionally, we will need to call you to get further information.  For urgent matters (Monday-Friday), call the ENT Clinic: [phone] and speak with a call center team member - they will route your call appropriately.   If you'd like to speak directly with a nurse, please find our contact information below. We do our best to check voicemail frequently throughout the day, and will work to call you back within 1-2 days. For urgent matters, please use the general clinic phone numbers listed above.    Ofe PENG, RN, BSN  RN Care Coordinator, ENT Clinic  [facility]  Direct: [phone]  Jennifer LOU LPN  Direct: [phone]

## 2024-06-13 NOTE — LETTER
"6/13/2024      RE: Monet Finley  [address] [address]       Monet Finley is a 53 year old female being seen for a 3-week postoperative visit following a right tympanomastoidectomy with cartilage backing and facial nerve monitoring; performed on 05/22/2024. Today, she is doing well postoperatively. She does not have any pain and only reports some itchiness in both ears. She has been using drops as prescribed. She feels that the ear is popping, getting less stuffy, and the hearing is improving.     05/22/2024 Procedures:  1.  Right tympanomastoidectomy with cartilage backing  2.  Facial nerve monitoring x 2.5 hours  Findings:  Thick firm tissue within the antrum, epitympanum and mesotympanum surrounding the entire ossicular chain with thick mucosa overlying the remainder of the floor of the middle ear, ossicular chain intact and mobile.    BP (!) 149/82 (BP Location: Right arm, Patient Position: Sitting, Cuff Size: Adult Regular)   Pulse 75   Ht 1.499 m (4' 11\")   Wt 49 kg (108 lb)   BMI 21.81 kg/m      Physical examination:  female in no acute distress.  Alert and answering questions appropriately.  HB 1/6 bilaterally.  Right postauricular incision with glue and sticker in place which were gently removed. The incision is healing well. Bilateral ears examined under the microscope.  Right EAC is clear with no residual packing. There is scant thin drainage overlying the TM which was suctioned. The TM is intact and healing well but a little thickened.   Left EAC is clear. small central perforation, approximately 25%, was seen with thickened edges, middle ear mucosa clear, no retractions seen.     Assessment and plan:  Monet Finley is a 53 year old female being seen for a 3-week postoperative visit following a right tympanomastoidectomy with cartilage backing and facial nerve monitoring; performed on 05/22/2024.  She is doing well postoperatively. She may lift her activity restrictions but " should continue with water precautions on the right. She will continue Floxin drops and we will add Pred Forte drops to her regimen. She will follow up in 3 weeks with same day audiogram.     Nancy Chan MD  Fellow Physician  Otology & Neurotology  [facility]      I, Rebeca Young MD, saw this patient with the resident/fellow and agree with the resident/fellow s findings and plan of care as documented in the resident s/fellow s note. I was present for the entire procedure.    Rebeca Young MD

## 2024-06-13 NOTE — PROGRESS NOTES
"Monet Finley is a 53 year old female being seen for a 3-week postoperative visit following a right tympanomastoidectomy with cartilage backing and facial nerve monitoring; performed on 05/22/2024. Today, she is doing well postoperatively. She does not have any pain and only reports some itchiness in both ears. She has been using drops as prescribed. She feels that the ear is popping, getting less stuffy, and the hearing is improving.     05/22/2024 Procedures:  1.  Right tympanomastoidectomy with cartilage backing  2.  Facial nerve monitoring x 2.5 hours  Findings:  Thick firm tissue within the antrum, epitympanum and mesotympanum surrounding the entire ossicular chain with thick mucosa overlying the remainder of the floor of the middle ear, ossicular chain intact and mobile.    BP (!) 149/82 (BP Location: Right arm, Patient Position: Sitting, Cuff Size: Adult Regular)   Pulse 75   Ht 1.499 m (4' 11\")   Wt 49 kg (108 lb)   BMI 21.81 kg/m      Physical examination:  female in no acute distress.  Alert and answering questions appropriately.  HB 1/6 bilaterally.  Right postauricular incision with glue and sticker in place which were gently removed. The incision is healing well. Bilateral ears examined under the microscope.  Right EAC is clear with no residual packing. There is scant thin drainage overlying the TM which was suctioned. The TM is intact and healing well but a little thickened.   Left EAC is clear. small central perforation, approximately 25%, was seen with thickened edges, middle ear mucosa clear, no retractions seen.     Assessment and plan:  Monet Finley is a 53 year old female being seen for a 3-week postoperative visit following a right tympanomastoidectomy with cartilage backing and facial nerve monitoring; performed on 05/22/2024.  She is doing well postoperatively. She may lift her activity restrictions but should continue with water precautions on the right. She will continue Floxin " drops and we will add Pred Forte drops to her regimen. She will follow up in 3 weeks with same day audiogram.     Nancy Chan MD  Fellow Physician  Otology & Neurotology  [facility]      I, Rebeca Young MD, saw this patient with the resident/fellow and agree with the resident/fellow s findings and plan of care as documented in the resident s/fellow s note. I was present for the entire procedure.    Rebeca Young MD

## 2024-07-11 ENCOUNTER — OFFICE VISIT (OUTPATIENT)
Dept: AUDIOLOGY | Facility: CLINIC | Age: 54
End: 2024-07-11
Payer: COMMERCIAL

## 2024-07-11 ENCOUNTER — OFFICE VISIT (OUTPATIENT)
Dept: OTOLARYNGOLOGY | Facility: CLINIC | Age: 54
End: 2024-07-11
Payer: COMMERCIAL

## 2024-07-11 DIAGNOSIS — H72.92 PERFORATION OF LEFT TYMPANIC MEMBRANE: ICD-10-CM

## 2024-07-11 DIAGNOSIS — H90.6 MIXED CONDUCTIVE AND SENSORINEURAL HEARING LOSS OF BOTH EARS: Primary | ICD-10-CM

## 2024-07-11 DIAGNOSIS — H90.6 MIXED HEARING LOSS, BILATERAL: Primary | ICD-10-CM

## 2024-07-11 PROCEDURE — 92567 TYMPANOMETRY: CPT | Mod: 52 | Performed by: AUDIOLOGIST

## 2024-07-11 PROCEDURE — 92557 COMPREHENSIVE HEARING TEST: CPT | Performed by: AUDIOLOGIST

## 2024-07-11 PROCEDURE — 99024 POSTOP FOLLOW-UP VISIT: CPT | Performed by: OTOLARYNGOLOGY

## 2024-07-11 ASSESSMENT — PAIN SCALES - GENERAL: PAINLEVEL: NO PAIN (0)

## 2024-07-11 NOTE — LETTER
7/11/2024      RE: Monet Finley  [address] [facility] [address]         [facility] [address]  4TH FLOOR  [facility] [address]  Phone: [phone]  Fax: [phone]    Patient:  Monet Finley, Date of birth 1970  Date of Visit:  07/11/2024  Referring Provider Referred Self      Assessment & Plan     54 year old female s/p right tympanomastoidectomy with cartilage backing and facial nerve monitoring; performed on 05/22/2024. Healing well postoperatively. She is hearing much better on the right. Physical exam shows evidence of a fairly large scar that has formed on the inferior aspect of the right TM which likely accounts for the continued conductive hearing loss. She has a stable small left perforation. Given her bilateral mixed loss, her sister asked about hearing aid usage and we would be in favor of a trial. Monet initially said she didn't want to wear hearing aids but agreed to at least do a 30 day trial. We have placed a hearing aid consult. Will see her back again in 6 months as she has possible right eustachian tube dysfunction that may need intervention now that the perforation is closed.     HPI  Monet Finley is seen for a second post op visit after undergoing a right tympanomastoidectomy with cartilage backing and facial nerve monitoring; performed on 05/22/2024. She is doing well postoperatively. She states her hearing is much better. She has been using drops as prescribed. No issues with her left ear. She is with her sister who serves partially as her .    Physical examination:  female in no acute distress.  Alert and answering questions appropriately.  HB 1/6 bilaterally.  Both ears examined under the microscope.   Right ear canal is clear, TM has a little bit of steroid drop residue, fairly large scar that has formed on the inferior aspect of TM that creates a deep divot just posterior to the inferior scar  band.  Left EAC clean, left TM with a 15% posterior inferior perforation, dry crusting on the edges which was not removed, middle ear aerated.     Audiogram:  independently reviewed and compared to her preoperative test. Right mild/moderate downsloping to severe mixed loss with 92% speech discrimination, left mild/moderate downsloping to severe/profound mixed loss with 96% speech discrimination, bilateral normal downsloipng to moderate upsloping to mild sensorineural component, left high volume flat tympanogram, right not tested.  The right has significantly improved from the severe conductive component that was present previously.      Scribe Disclosure:   I, Preeti Panda, am serving as a scribe; to document services personally performed by Rebeca Young MD -based on data collection and the provider's statements to me.     Provider Disclosure:  I agree with above History, Review of Systems, Physical exam and Plan.  I have reviewed the content of the documentation and have edited it as needed. I have personally performed the services documented here and the documentation accurately represents those services and the decisions I have made.      Rebeca Young MD

## 2024-07-11 NOTE — PATIENT INSTRUCTIONS
You were seen in the ENT Clinic today by Dr. Young. If you have any questions or concerns after your appointment, please contact us (see below)      Please return to clinic in 6 months    How to Contact Us:  Send a PAYMILL message to your provider. Our team will respond to you via PAYMILL. Occasionally, we will need to call you to get further information.  For urgent matters (Monday-Friday), call the ENT Clinic: [phone] and speak with a call center team member - they will route your call appropriately.   If you'd like to speak directly with a nurse, please find our contact information below. We do our best to check voicemail frequently throughout the day, and will work to call you back within 1-2 days. For urgent matters, please use the general clinic phone numbers listed above.    Ofe PENG, RN, BSN  RN Care Coordinator, ENT Clinic  [facility]  Direct: [phone]  Jennifer LOU LPN  Direct: [phone]

## 2024-07-11 NOTE — PROGRESS NOTES
[facility] EAR NOSE AND THROAT CLINIC [address] [address]  Phone: [phone]  Fax: [phone]    Patient:  Monet Finley, Date of birth 1970  Date of Visit:  07/11/2024  Referring Provider Referred Self      Assessment & Plan      54 year old female s/p right tympanomastoidectomy with cartilage backing and facial nerve monitoring; performed on 05/22/2024. Healing well postoperatively. She is hearing much better on the right. Physical exam shows evidence of a fairly large scar that has formed on the inferior aspect of the right TM which likely accounts for the continued conductive hearing loss. She has a stable small left perforation. Given her bilateral mixed loss, her sister asked about hearing aid usage and we would be in favor of a trial. Monet initially said she didn't want to wear hearing aids but agreed to at least do a 30 day trial. We have placed a hearing aid consult. Will see her back again in 6 months as she has possible right eustachian tube dysfunction that may need intervention now that the perforation is closed.     HPI  Monet Finley is seen for a second post op visit after undergoing a right tympanomastoidectomy with cartilage backing and facial nerve monitoring; performed on 05/22/2024. She is doing well postoperatively. She states her hearing is much better. She has been using drops as prescribed. No issues with her left ear. She is with her sister who serves partially as her .    Physical examination:  female in no acute distress.  Alert and answering questions appropriately.  HB 1/6 bilaterally.  Both ears examined under the microscope.   Right ear canal is clear, TM has a little bit of steroid drop residue, fairly large scar that has formed on the inferior aspect of TM that creates a deep divot just posterior to the inferior scar band.  Left EAC clean, left TM with a 15% posterior inferior perforation, dry  crusting on the edges which was not removed, middle ear aerated.     Audiogram:  independently reviewed and compared to her preoperative test. Right mild/moderate downsloping to severe mixed loss with 92% speech discrimination, left mild/moderate downsloping to severe/profound mixed loss with 96% speech discrimination, bilateral normal downsloipng to moderate upsloping to mild sensorineural component, left high volume flat tympanogram, right not tested.  The right has significantly improved from the severe conductive component that was present previously.      Scribe Disclosure:   I, Preeti Panda, am serving as a scribe; to document services personally performed by Rebeca Young MD -based on data collection and the provider's statements to me.     Provider Disclosure:  I agree with above History, Review of Systems, Physical exam and Plan.  I have reviewed the content of the documentation and have edited it as needed. I have personally performed the services documented here and the documentation accurately represents those services and the decisions I have made.

## 2024-07-11 NOTE — PROGRESS NOTES
AUDIOLOGY REPORT    SUMMARY: Audiology visit completed. See audiogram for results.      RECOMMENDATIONS: Follow-up with ENT.    Mignon Middleton, CCC-A  Clinical Audiologist  [place] #[id number]

## 2024-11-01 ENCOUNTER — TELEPHONE (OUTPATIENT)
Dept: OTOLARYNGOLOGY | Facility: CLINIC | Age: 54
End: 2024-11-01
Payer: COMMERCIAL

## 2024-12-14 ENCOUNTER — HEALTH MAINTENANCE LETTER (OUTPATIENT)
Age: 54
End: 2024-12-14

## 2025-02-06 NOTE — PROGRESS NOTES
"  [facility] EAR NOSE AND THROAT CLINIC [address] [address]  Phone: [phone]  Fax: [phone]    Patient:  Monet Finley, Date of birth 1970  Date of Visit:  02/13/2025  Referring Provider Referred Self      Assessment & Plan      Monet Finley is a 54 year old female being seen for follow-up. Physical examination showed that the scar band on the right has resolved, and a 5% pinpoint perforation that has reduced in size on the left side. We discussed that she would benefit from a hearing aid, and this is an option that she can explore at any time and at her discretion. She has been encouraged to reach out if new concerns arise. They are in agreement with this plan, and will return in 6-months.      HPI  Monet Finley is a 54 year old female being seen for follow-up. She is accompanied by her sister. She is s/p right tympanomastoidectomy with cartilage backing and facial nerve monitoring; performed on 05/22/2024. She was previously seen on 07/11/2024, where physical examination shown evidence of a fairly large scar that has formed on the inferior aspect of the right TM and a stable small left perforation. She and her sister decided to proceed with a hearing aid trial.     She says that since the previous visit, she has josef feeling fine with no new issues to report. She did not do a hearing aid trial.     BP (!) 149/96   Pulse 80   Ht 1.499 m (4' 11\")   Wt 48 kg (105 lb 12.8 oz)   SpO2 99%   BMI 21.37 kg/m     Physical examination:  female in no acute distress.  Alert and answering questions appropriately.  HB 1/6 bilaterally.  Both ears examined under the microscope.   Right ear canal clear, TM is intact and the cartilage backing is in position. The middle ear is well aerated. Scar band has essentially resolved itself.   Left ear canal with dried cerumen and crust medially, which was removed with an alligator. There also some resting " on the TM itself and this was removed with a straight pick and alligator. She continues to have a approximately 5% posterior inferior perforation with clean and aerated middle ear.     Scribe Disclosure:   I, Kaci Fry, am serving as a scribe; to document services personally performed by Rebeca Young MD -based on data collection and the provider's statements to me.     Provider Disclosure:  I agree with above History, Review of Systems, Physical exam and Plan.  I have reviewed the content of the documentation and have edited it as needed. I have personally performed the services documented here and the documentation accurately represents those services and the decisions I have made.

## 2025-02-13 ENCOUNTER — OFFICE VISIT (OUTPATIENT)
Dept: OTOLARYNGOLOGY | Facility: CLINIC | Age: 55
End: 2025-02-13
Payer: COMMERCIAL

## 2025-02-13 VITALS
DIASTOLIC BLOOD PRESSURE: 96 MMHG | OXYGEN SATURATION: 99 % | HEIGHT: 59 IN | SYSTOLIC BLOOD PRESSURE: 149 MMHG | HEART RATE: 80 BPM | BODY MASS INDEX: 21.33 KG/M2 | WEIGHT: 105.8 LBS

## 2025-02-13 DIAGNOSIS — H90.6 MIXED CONDUCTIVE AND SENSORINEURAL HEARING LOSS OF BOTH EARS: ICD-10-CM

## 2025-02-13 DIAGNOSIS — H72.92 PERFORATION OF LEFT TYMPANIC MEMBRANE: Primary | ICD-10-CM

## 2025-02-13 PROCEDURE — 3077F SYST BP >= 140 MM HG: CPT | Performed by: OTOLARYNGOLOGY

## 2025-02-13 PROCEDURE — 3080F DIAST BP >= 90 MM HG: CPT | Performed by: OTOLARYNGOLOGY

## 2025-02-13 PROCEDURE — 99212 OFFICE O/P EST SF 10 MIN: CPT | Mod: 25 | Performed by: OTOLARYNGOLOGY

## 2025-02-13 PROCEDURE — 1126F AMNT PAIN NOTED NONE PRSNT: CPT | Performed by: OTOLARYNGOLOGY

## 2025-02-13 PROCEDURE — 92504 EAR MICROSCOPY EXAMINATION: CPT | Performed by: OTOLARYNGOLOGY

## 2025-02-13 ASSESSMENT — PAIN SCALES - GENERAL: PAINLEVEL_OUTOF10: NO PAIN (0)

## 2025-02-13 NOTE — PATIENT INSTRUCTIONS
You were seen in the ENT Clinic today by Dr. Young. If you have any questions or concerns after your appointment, please contact us (see below)        Please return to clinic in 6 months    How to Contact Us:  Send a ORVIBO message to your provider. Our team will respond to you via ORVIBO. Occasionally, we will need to call you to get further information.  For urgent matters (Monday-Friday), call the ENT Clinic: [phone] and speak with a call center team member - they will route your call appropriately.   If you'd like to speak directly with a nurse, please find our contact information below. We do our best to check voicemail frequently throughout the day, and will work to call you back within 1-2 days. For urgent matters, please use the general clinic phone numbers listed above.    Ofe PENG, RN, BSN  RN Care Coordinator, ENT Clinic  [facility]  Direct: [phone]  Jennifer LOU LPN  Direct: [phone]

## 2025-02-13 NOTE — LETTER
"2/13/2025      RE: Monet Finley  [address] [facility] [address]         [facility] EAR NOSE AND THROAT CLINIC [address]  4TH FLOOR  [facility] [address]  Phone: [phone]  Fax: [phone]    Patient:  Monet Finley, Date of birth 1970  Date of Visit:  02/13/2025  Referring Provider Referred Self      Assessment & Plan     Monet Finley is a 54 year old female being seen for follow-up. Physical examination showed that the scar band on the right has resolved, and a 5% pinpoint perforation that has reduced in size on the left side. We discussed that she would benefit from a hearing aid, and this is an option that she can explore at any time and at her discretion. She has been encouraged to reach out if new concerns arise. They are in agreement with this plan, and will return in 6-months.      HPI  Monet Finley is a 54 year old female being seen for follow-up. She is accompanied by her sister. She is s/p right tympanomastoidectomy with cartilage backing and facial nerve monitoring; performed on 05/22/2024. She was previously seen on 07/11/2024, where physical examination shown evidence of a fairly large scar that has formed on the inferior aspect of the right TM and a stable small left perforation. She and her sister decided to proceed with a hearing aid trial.     She says that since the previous visit, she has josef feeling fine with no new issues to report. She did not do a hearing aid trial.     BP (!) 149/96   Pulse 80   Ht 1.499 m (4' 11\")   Wt 48 kg (105 lb 12.8 oz)   SpO2 99%   BMI 21.37 kg/m     Physical examination:  female in no acute distress.  Alert and answering questions appropriately.  HB 1/6 bilaterally.  Both ears examined under the microscope.   Right ear canal clear, TM is intact and the cartilage backing is in position. The middle ear is well aerated. Scar band has essentially resolved itself.   Left ear canal with dried cerumen and " crust medially, which was removed with an alligator. There also some resting on the TM itself and this was removed with a straight pick and alligator. She continues to have a approximately 5% posterior inferior perforation with clean and aerated middle ear.     Scribe Disclosure:   I, Kaci Fry, am serving as a scribe; to document services personally performed by Rebeca Young MD -based on data collection and the provider's statements to me.     Provider Disclosure:  I agree with above History, Review of Systems, Physical exam and Plan.  I have reviewed the content of the documentation and have edited it as needed. I have personally performed the services documented here and the documentation accurately represents those services and the decisions I have made.        Rebeca Young MD

## 2025-06-08 ENCOUNTER — HEALTH MAINTENANCE LETTER (OUTPATIENT)
Age: 55
End: 2025-06-08

## 2025-06-09 ENCOUNTER — TELEPHONE (OUTPATIENT)
Dept: OTOLARYNGOLOGY | Facility: CLINIC | Age: 55
End: 2025-06-09
Payer: COMMERCIAL

## 2025-06-09 NOTE — TELEPHONE ENCOUNTER
Left Voicemail (1st Attempt) and Sent Mychart (1st Attempt) for the patient to call back and schedule the following:    Appointment Type: Return ENT  Provider: Dr. Rebeca Young   Appt date: R/S 8/14 TO NEXT OPEN  Specialty phone number: [phone]

## (undated) DEVICE — DRAPE WARMER 66X44" ORS-300

## (undated) DEVICE — SUCTION MANIFOLD NEPTUNE 2 SYS 1 PORT 702-025-000

## (undated) DEVICE — BLADE CLIPPER SGL USE 9680

## (undated) DEVICE — NIM ELEC SUBDERMAL NDL 3PAIR/BOX

## (undated) DEVICE — GLOVE BIOGEL PI MICRO SZ 6.5 48565

## (undated) DEVICE — INSTRUMENT WIPE VISIWIPE 581047

## (undated) DEVICE — SOL NACL 0.9% INJ 1000ML BAG 2B1324X

## (undated) DEVICE — BONE WAX 2.5GM W31G

## (undated) DEVICE — PREP POVIDONE-IODINE 7.5% SCRUB 4OZ BOTTLE MDS093945

## (undated) DEVICE — DRAPE MICROSCOPE LEICA 54X120" 09-MK653

## (undated) DEVICE — BUR STRK ROUND DIAMOND 4.0MM COARSE S2 TT DRIVE 5540-013-040

## (undated) DEVICE — DRSG TEGADERM 2 3/8X2 3/4" 1624W

## (undated) DEVICE — BLADE KNIFE BEAVER MINI BEAVER6400

## (undated) DEVICE — DRAPE U SPLIT 74X120" 29440

## (undated) DEVICE — PREP PAD ALCOHOL 6818

## (undated) DEVICE — BUR STRK ROUND 5.0MM MULTI-FLUTE 5H S2 PIDRIVE 5540-010-050

## (undated) DEVICE — RX BACITRACIN OINTMENT 14G 0.5OZ 45802-060-01

## (undated) DEVICE — BUR STRK ROUND 6.0MM MULTI-FLUTE 4H S2 PIDRIVE 5540-010-060

## (undated) DEVICE — PREP POVIDONE IODINE SOLUTION 10% 4OZ BOTTLE 29906-004

## (undated) DEVICE — TUBING STRYKER IRRIGATION CASSETTE 5400-050-001

## (undated) DEVICE — BLADE KNIFE CARTILAGE KURZ 8000140

## (undated) DEVICE — PACK EAR CUSTOM ASC

## (undated) DEVICE — ESU ELEC BLADE 2.75" COATED/INSULATED E1455

## (undated) DEVICE — SOL NACL 0.9% IRRIG 500ML BOTTLE 2F7123

## (undated) DEVICE — ESU GROUND PAD ADULT W/CORD E7507

## (undated) DEVICE — LINEN TOWEL PACK X5 5464

## (undated) DEVICE — TRAY PREP DRY SKIN SCRUB 067

## (undated) DEVICE — SOL WATER IRRIG 500ML BOTTLE 2F7113

## (undated) DEVICE — STRAP KNEE/BODY 31143004

## (undated) DEVICE — SPONGE SURGIFOAM 100 1974

## (undated) DEVICE — SU VICRYL 3-0 PS-2 27" UND J427H

## (undated) DEVICE — SYR 10ML LL W/O NDL

## (undated) DEVICE — DRSG GLASSCOCK ADULT S-1000

## (undated) DEVICE — SU DERMABOND PRINEO 22CM CLR222US

## (undated) RX ORDER — ONDANSETRON 2 MG/ML
INJECTION INTRAMUSCULAR; INTRAVENOUS
Status: DISPENSED
Start: 2024-05-22

## (undated) RX ORDER — PROPOFOL 10 MG/ML
INJECTION, EMULSION INTRAVENOUS
Status: DISPENSED
Start: 2024-05-22

## (undated) RX ORDER — REMIFENTANIL HYDROCHLORIDE 1 MG/ML
INJECTION, POWDER, LYOPHILIZED, FOR SOLUTION INTRAVENOUS
Status: DISPENSED
Start: 2024-05-22

## (undated) RX ORDER — ACETAMINOPHEN 325 MG/1
TABLET ORAL
Status: DISPENSED
Start: 2024-05-22

## (undated) RX ORDER — OXYCODONE HYDROCHLORIDE 5 MG/1
TABLET ORAL
Status: DISPENSED
Start: 2024-05-22

## (undated) RX ORDER — FENTANYL CITRATE 50 UG/ML
INJECTION, SOLUTION INTRAMUSCULAR; INTRAVENOUS
Status: DISPENSED
Start: 2024-05-22

## (undated) RX ORDER — DEXAMETHASONE SODIUM PHOSPHATE 10 MG/ML
INJECTION, SOLUTION INTRAMUSCULAR; INTRAVENOUS
Status: DISPENSED
Start: 2024-05-22

## (undated) RX ORDER — GLYCOPYRROLATE 0.2 MG/ML
INJECTION INTRAMUSCULAR; INTRAVENOUS
Status: DISPENSED
Start: 2024-05-22

## (undated) RX ORDER — CEFAZOLIN SODIUM 2 G/50ML
SOLUTION INTRAVENOUS
Status: DISPENSED
Start: 2024-05-22